# Patient Record
Sex: FEMALE | Race: WHITE | NOT HISPANIC OR LATINO
[De-identification: names, ages, dates, MRNs, and addresses within clinical notes are randomized per-mention and may not be internally consistent; named-entity substitution may affect disease eponyms.]

---

## 2022-05-09 PROBLEM — Z00.00 ENCOUNTER FOR PREVENTIVE HEALTH EXAMINATION: Status: ACTIVE | Noted: 2022-05-09

## 2022-05-10 ENCOUNTER — APPOINTMENT (OUTPATIENT)
Dept: ORTHOPEDIC SURGERY | Facility: CLINIC | Age: 57
End: 2022-05-10
Payer: COMMERCIAL

## 2022-05-10 VITALS — BODY MASS INDEX: 23.05 KG/M2 | HEIGHT: 64 IN | WEIGHT: 135 LBS

## 2022-05-10 DIAGNOSIS — Z78.9 OTHER SPECIFIED HEALTH STATUS: ICD-10-CM

## 2022-05-10 DIAGNOSIS — M24.011 LOOSE BODY IN RIGHT SHOULDER: ICD-10-CM

## 2022-05-10 PROCEDURE — 99214 OFFICE O/P EST MOD 30 MIN: CPT

## 2022-05-10 PROCEDURE — 73030 X-RAY EXAM OF SHOULDER: CPT | Mod: LT

## 2022-05-10 RX ORDER — PREDNISONE 20 MG/1
20 TABLET ORAL DAILY
Qty: 10 | Refills: 0 | Status: ACTIVE | COMMUNITY
Start: 2022-05-10 | End: 1900-01-01

## 2022-05-10 RX ORDER — ETANERCEPT 25 MG/.5ML
25 SOLUTION SUBCUTANEOUS
Refills: 0 | Status: ACTIVE | COMMUNITY

## 2022-05-10 RX ORDER — CELECOXIB 200 MG/1
200 CAPSULE ORAL
Refills: 0 | Status: ACTIVE | COMMUNITY

## 2022-05-10 NOTE — HISTORY OF PRESENT ILLNESS
[de-identified] : The patient has right shoulder pain s/p rotator cuff repair revision in 2017. She has pain at night for 6-7 months. pain is 8/10. Patient denies any recent PT or injections. Patient has a history of RA and is taking Enbrel. However, patient feels this is more mechanical pain. The patients pain is both posterior and anterior to the shoulder, worse with increased activity. The patient has tried Prednisone and Mobic in the past for other issues and responds well.\par For the left shoulder the patient was previously diagnosed with low grade partial thickness cuff tear. Prednisone and Mobic were helpful for this. Patient did not try PT. Her left shoulder pain has increased in the last 6-7 months, worse at night. The patient also reports decreased range of motion.\par

## 2022-05-10 NOTE — PHYSICAL EXAM
[Right] : right shoulder [Left] : left shoulder [4___] : internal rotation 4[unfilled]/5 [de-identified] : Constitutional: The general appearance of the patient is well developed, well nourished, no deformities and well groomed. Normal\par \par  Gait: Gait and function is as follows: normal gait.\par \par  Skin: Head and neck visualized skin is normal. Left upper extremity visualized skin is normal. Right upper extremity visualized skin is normal. \par \par  Cardiovascular: palpable radial pulse bilaterally.\par \par  Neurologic: The patient is oriented to time, place and person. Sensation to light touch intact in the bilateral upper extremities. Mood and Affect is normal.\par  [] : no tenderness at bicipital groove [de-identified] : -shine bilaterally. + speeds bilaterally.  [FreeTextEntry8] : supra TTP. AC joint TTP. [FreeTextEntry9] : at 70 ABD there is 30 IR and 80 ER [de-identified] : supra 4/5 [FreeTextEntry1] : right shoulder x-rays: RCR hardware intact. no high riding humeral head. no calcifications. loose body that appears bony. \par left shoulder x-rays: no high riding humeral head. no GHOA. small calcific tendonitis of the rotator cuff. type I acromion.  [TWNoteComboBox7] : active forward flexion 170 degrees [TWNoteComboBox4] : passive forward flexion 130 degrees [de-identified] : active abduction 100 degrees [TWNoteComboBox9] : passive abduction 70 degrees [TWNoteComboBox6] : internal rotation L1 [de-identified] : external rotation 70 degrees

## 2022-05-10 NOTE — DISCUSSION/SUMMARY
[de-identified] : The patient has a right shoulder possible loose body near the glenohumeral joint. She also has a left shoulder calcific tendonitis of the rotator cuff\par  \par For the right shoulder:\par The patient was prescribed a CT arthrogram of the right shoulder to evaluate for loose body\par If there is a loose body present, in the GH joint, the patient would need an arthroscopic removal of that loose body because of the locking complaints. \par \par For the left shoulder:\par The nature of calcific tendonitis was discussed with the patient in detail \par The patient was prescribed a 5 day course of prednisone \par The possibility of early adhesive capsulitis was discussed with the patient \par The patient will hold off on PT at this time until we evaluate the need for possible right shoulder Sx\par If the patient’s pain is not improved, we could consider a SA cortisone injection \par \par The patient will follow up in 1-2 weeks to review CT scan\par We could consider starting PT for the bilateral shoulders if loose body is ruled out for right shoulder \par \par \par I, Dr. Tray Aponte, attest that this documentation was recorded by the scribe, in my presence, and that it accurately reflects the service I personally performed, and the decisions made by me with my edits as appropriate.\par \par IKyle, acting as scribe, attest that this documentation has been prepared under the direction and in the presence of Provider Tray Aponte MD.\par

## 2022-05-10 NOTE — REASON FOR VISIT
[FreeTextEntry2] : The patient has left shoulder low grade partial thickness supraspinatus tear with subacromial impingement syndrome and low grade proximal biceps tear. The patient also presents today for right shoulder pain of 6-7 months duration.

## 2022-05-12 ENCOUNTER — RESULT REVIEW (OUTPATIENT)
Age: 57
End: 2022-05-12

## 2022-05-19 ENCOUNTER — APPOINTMENT (OUTPATIENT)
Dept: ORTHOPEDIC SURGERY | Facility: CLINIC | Age: 57
End: 2022-05-19
Payer: COMMERCIAL

## 2022-05-19 VITALS — HEIGHT: 64 IN | WEIGHT: 135 LBS | BODY MASS INDEX: 23.05 KG/M2

## 2022-05-19 PROCEDURE — 99214 OFFICE O/P EST MOD 30 MIN: CPT

## 2022-05-19 NOTE — HISTORY OF PRESENT ILLNESS
[de-identified] : The patient states that she still has right shoulder intermittent pain and locking. It is minimally improved with prednisone. she denies new trauma to the shoulder. she is here for the review of CT arthrogram. \par \par The patient states that her left shoulder pain has improved after prednisone, but still present.\par \par \par

## 2022-05-19 NOTE — PHYSICAL EXAM
[Right] : right shoulder [Left] : left shoulder [4___] : internal rotation 4[unfilled]/5 [de-identified] : Constitutional: The general appearance of the patient is well developed, well nourished, no deformities and well groomed. Normal\par \par  Gait: Gait and function is as follows: normal gait.\par \par  Skin: Head and neck visualized skin is normal. Left upper extremity visualized skin is normal. Right upper extremity visualized skin is normal. \par \par  Cardiovascular: palpable radial pulse bilaterally.\par \par  Neurologic: The patient is oriented to time, place and person. Sensation to light touch intact in the bilateral upper extremities. Mood and Affect is normal.\par  [] : no tenderness at bicipital groove [de-identified] : -rl, + speeds  [FreeTextEntry8] : supra TTP. AC joint TTP. [FreeTextEntry9] : at 70 ABD there is 30 IR and 80 ER [de-identified] : supra 4/5 [FreeTextEntry1] : 5/12/22 CT arthrogram right shoulder. moderate sized full thickness rotator cuff tear. ossification of inferior glenoid that appears to be extracapsular, supra intact. no ac joint arthritis. biceps tenodeses intact. subscap intact. no GHOA anterior and posterior labrum intact. grade 1 atrophy of supraspinatus muscle belly.\par \par  [TWNoteComboBox7] : active forward flexion 170 degrees [TWNoteComboBox4] : passive forward flexion 130 degrees [de-identified] : active abduction 100 degrees [TWNoteComboBox9] : passive abduction 70 degrees [TWNoteComboBox6] : internal rotation L1 [de-identified] : external rotation 70 degrees

## 2022-05-19 NOTE — REASON FOR VISIT
[FreeTextEntry2] : The patient has right shoulder possible loose body near the glenohumeral joint. The also has left shoulder calcific tendonitis of the rotator cuff.

## 2022-05-19 NOTE — DISCUSSION/SUMMARY
[de-identified] :  The patient has right shoulder recurrent moderate sized full thickness tear of supraspinatus tendon. Patient has left shoulder calcific tendonitis.\par \par \par \par For the right shoulder:\par Patient is unable to consider right shoulder surgery as this tike secondary to job restrictions.\par She is considering surgery in October if necessary\par Patient will proceed with HEP.\par Patient will follow up as needed.\par Once pain or function become intolerable we will give consideration to revision RCR with bio-inductive patch augmentation and application of PRP.\par \par \par For the left shoulder:\par The patient will continue with activity as tolerated.\par She was given a prescription for prednisone to use if there is increased pain in the shoulder.\par We could consider subacromial injections as needed.\par \par \par \par \par \par I, Dr. Tray Aponte, attest that this documentation was recorded by the scribe, in my presence, and that it accurately reflects the service I personally performed, and the decisions made by me with my edits as appropriate.\par \par I, Kyle Alan, acting as scribe, attest that this documentation has been prepared under the direction and in the presence of Provider Tray Aponte MD.\par \par

## 2022-06-02 ENCOUNTER — APPOINTMENT (OUTPATIENT)
Dept: ORTHOPEDIC SURGERY | Facility: CLINIC | Age: 57
End: 2022-06-02
Payer: COMMERCIAL

## 2022-06-02 VITALS — WEIGHT: 135 LBS | HEIGHT: 64 IN | BODY MASS INDEX: 23.05 KG/M2

## 2022-06-02 PROCEDURE — 99213 OFFICE O/P EST LOW 20 MIN: CPT | Mod: 25

## 2022-06-02 PROCEDURE — 20605 DRAIN/INJ JOINT/BURSA W/O US: CPT | Mod: 50

## 2022-06-02 RX ORDER — PREDNISONE 20 MG/1
20 TABLET ORAL DAILY
Qty: 10 | Refills: 0 | Status: ACTIVE | COMMUNITY
Start: 2022-06-02 | End: 1900-01-01

## 2022-06-02 NOTE — PROCEDURE
[Medium Joint Injection] : medium joint injection [Bilateral] : bilaterally of the [CMC Joint] : CMC joint [Pain] : pain [Inflammation] : inflammation [Alcohol] : alcohol [Ethyl Chloride sprayed topically] : ethyl chloride sprayed topically [Sterile technique used] : sterile technique used [___ cc    1%] : Lidocaine ~Vcc of 1%  [___ cc    10mg] : Triamcinolone (Kenalog) ~Vcc of 10 mg

## 2022-06-02 NOTE — HISTORY OF PRESENT ILLNESS
[7] : 7 [Full time] : Work status: full time [de-identified] : Ms. Kennedi Castillo, a 56-year-old female, presents today for b/l cmc arthritis. 6 months s/p 5th CSI with relief.\par Symptoms reoccurred. [] : no [FreeTextEntry1] : shanta hands  [de-identified] : Would like to receive injections today

## 2022-08-29 ENCOUNTER — APPOINTMENT (OUTPATIENT)
Dept: ORTHOPEDIC SURGERY | Facility: CLINIC | Age: 57
End: 2022-08-29

## 2022-08-29 PROCEDURE — 20611 DRAIN/INJ JOINT/BURSA W/US: CPT | Mod: 50

## 2022-08-29 PROCEDURE — J3490M: CUSTOM

## 2022-08-29 PROCEDURE — 99214 OFFICE O/P EST MOD 30 MIN: CPT | Mod: 25

## 2022-08-29 NOTE — HISTORY OF PRESENT ILLNESS
[de-identified] : 8/29/22: Cortisone in Jt helped a lot, pain returned recently.\par \par Prev doc:\par 11/29/21: Ms. Kennedi Castillo, a 56-year-old female, presents today for B/L knees. left knee worse then right. recently had Geniculate nerve ablation B/l knees with Dr. Frankenburger ablation nerve September 28, right knee/leg and Oct 5th left knee/leg. Complaint of locking in the left knee repeatedly. +nasids use. +nocturnal awakening. Tried CSI Injections and tried gel injections More relief from CSI injections.\par 1/31/22: Significant relief after last injection, wants to try again as pain returned recently.\par

## 2022-08-29 NOTE — ASSESSMENT
[FreeTextEntry1] : Previous doc:\par 11/29/21: Adv B/L Varus OA, with left knee worse then right knee. Tried nsaids, CSI injections, Gel injections, and genicular nerve ablation B/L knees. Seeing as she exhausted all forms of conservative treatment for OA she is a candidate for B/L TKA. Reports her  is having TKA in 1-2 wks, so she will see how he responds, Pt requested CSI injection today for B/l knees.\par 1/31/22: Excellent relief from inj last visit - it has only been 2 months since then but will repeat inj today and discussed risk of injections too frequently - she will try to hold off until June for her next inj. She understands that she needs TKA but her  is currently recovering and she is trying to delay to later in the year.\par \par 8/29/22: Repeat inj both knees tolerated well.  Will get auth for orthovisc both knees.  Trying to delay TKA.

## 2022-08-29 NOTE — PROCEDURE
[FreeTextEntry3] : Large joint injection was performed on the both knees. The indication for this procedure was pain, inflammation, and x-ray evidence of Osteoarthritis on this or prior visit. The site was prepped with betadine, ethyl chloride sprayed topically, and sterile technique used. An injection of Lidocaine 3cc of 1% , Bupivacaine (Marcaine) 5cc of 0.25% , Methylprednisolone (Depomedrol) cc of 80 mg was used. Patient was advised to call if redness, pain, or fever occur, apply ice for 15 minutes out of every hour for the next 12-24 hours as tolerated and patient was advised to rest the joint(s) for days.\par Patient has tried OTC's including aspirin, Ibuprofen, Aleve, etc or prescription NSAIDS, and/or exercises at home and/or physical therapy without satisfactory response and patient had decreased mobility in the joint. Ultrasound guidance was indicated for this patient due to better visualize joint space. All ultrasound images have been permanently captured and stored accordingly in our picture.\par

## 2022-08-29 NOTE — IMAGING
[de-identified] : left knee: \par pain med and lateral joint line \par Crepitus\par NIVI\par Strenth 5/5\par Pulses +2 DP/PT\par Decreased ROM -\par \par right knee: \par pain med and lateral joint line \par Crepitus\par NIVI\par Strenth 5/5\par Pulses +2 DP/PT\par Decreased ROM -\par

## 2022-08-29 NOTE — DISCUSSION/SUMMARY
[de-identified] : The natural progression of Osteoarthritis was explained to the patient.  We discussed the possible treatment options from conservative to operative.  These included NSAIDS, Glucosamine and Chondrotin sulfate, Physical Therapy and injections.  We also discussed that at some point they may progress to needing a TKA.\par

## 2022-09-15 ENCOUNTER — APPOINTMENT (OUTPATIENT)
Dept: ORTHOPEDIC SURGERY | Facility: CLINIC | Age: 57
End: 2022-09-15
Payer: COMMERCIAL

## 2022-09-15 PROCEDURE — 20610 DRAIN/INJ JOINT/BURSA W/O US: CPT | Mod: LT

## 2022-09-15 PROCEDURE — 99214 OFFICE O/P EST MOD 30 MIN: CPT | Mod: 25

## 2022-09-15 NOTE — PROCEDURE
[Large Joint Injection] : Large joint injection [Left] : of the left [Subacromial Space] : subacromial space [Pain] : pain [Inflammation] : inflammation [Betadine] : betadine [Ethyl Chloride sprayed topically] : ethyl chloride sprayed topically [Sterile technique used] : sterile technique used [___ cc    1%] : Lidocaine ~Vcc of 1%  [___ cc    40mg] : Methylprednisolone (Depomedrol) ~Vcc of 40 mg  [Call if redness, pain or fever occur] : call if redness, pain or fever occur [Apply ice for 15min out of every hour for the next 12-24 hours as tolerated] : apply ice for 15 minutes out of every hour for the next 12-24 hours as tolerated [Patient was advised to rest the joint(s) for ____ days] : patient was advised to rest the joint(s) for [unfilled] days [Risks, benefits, alternatives discussed / Verbal consent obtained] : the risks benefits, and alternatives have been discussed, and verbal consent was obtained

## 2022-09-15 NOTE — PHYSICAL EXAM
[Right] : right shoulder [5___] : internal rotation 5[unfilled]/5 [Normal Coordination] : normal coordination [Normal Sensation] : normal sensation [Normal Mood and Affect] : normal mood and affect [Orientated] : orientated [Able to Communicate] : able to communicate [Normal Skin] : normal skin [No obvious lymphadenopathy in areas examined] : no obvious lymphadenopathy in areas examined [Well Developed] : well developed [Well Nourished] : well nourished [Peripheral vascular exam is grossly normal] : peripheral vascular exam is grossly normal [No Respiratory Distress] : no respiratory distress [] : no sero-sanguinous drainage [FreeTextEntry8] : + biceps tenderness [FreeTextEntry9] : Internal rotation to T8 [de-identified] : 5 out of 5 supraspinatus. [TWNoteComboBox7] : active forward flexion 180 degrees [de-identified] : active abduction 90 degrees [de-identified] : external rotation 60 degrees

## 2022-09-15 NOTE — HISTORY OF PRESENT ILLNESS
[de-identified] : The patient is a 57 year old male here for re-evaluation of both shoulders. She continues to have bilateral shoulder pain and would like to go forward with rotator cuff surgery on the right. The patient's pain is posterior to anterior. She takes Celebrex and is on Embril for rheumatoid arthritis. The Embril is a weekly medication. The pain is worse with sleeping. She has no complaints of radiating pain. The patient has had 2 prior rotator cuff surgeries on the right shoulder. \par \par For her left shoulder:\par Her left shoulder pain has increased and is worse at night. The patient also reports decreased range of motion. She is wondering if she can have a cortisone injection in the shoulder today.\par

## 2022-09-15 NOTE — REASON FOR VISIT
[Spouse] : spouse [FreeTextEntry2] : The patient has right shoulder recurrent moderate sized full thickness tear of supraspinatus tendon. Patient has left shoulder calcific tendonitis.

## 2022-09-15 NOTE — DISCUSSION/SUMMARY
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 .The patient also has left shoulder calcific tendonitis of the rotator cuff. \par \par For the left shoulder:\par The patient is still having persistent pain.\par The patient tried prednisone without success.\par The patient will try a subacromial injection today.\par \par For the right shoulder:\par The patient has failed multiple non operative treatment options, including activity modification, PT and medications.  \par She is till having persistent pain.\par All risks, benefits and alternatives to surgery were discussed in detail with the patient. \par The patient chooses to proceed with surgical intervention at this time. \par \par  \par The indication is as follows: \par \par The patient is indicated for right shoulder arthroscopy with revision RCR, application of bio inductive implant, regenten patch, and application of PRP.\par \par \par The procedure is scheduled for 11/21/2022. \par \par The procedure will take 1 hours. \par \par The doctor will require 1 assistants. \par \par The patient will require medical and rheumatology clearance. \par \par The patient will require cryotherapy. \par \par The patient will require a Mower Arc 2.0 brace. \par \par We will need the [employer], and [employer] representative.\par \par We need rheumatology clearance to determine when to stop Embril.\par \par \par \par I, Dr. Tray Aponte, attest that this documentation was recorded by the scribe, in my presence, and that it accurately reflects the service I personally performed, and the decisions made by me with my edits as appropriate.\par  \par \par I, Carly Arellano, acting as scribe, attest that this documentation has been prepared under the direction and in the presence of Provider Tray Aponte MD.\par

## 2022-09-19 ENCOUNTER — APPOINTMENT (OUTPATIENT)
Dept: ORTHOPEDIC SURGERY | Facility: CLINIC | Age: 57
End: 2022-09-19

## 2022-09-19 VITALS — HEIGHT: 64 IN | WEIGHT: 135 LBS | BODY MASS INDEX: 23.05 KG/M2

## 2022-09-19 PROCEDURE — 99213 OFFICE O/P EST LOW 20 MIN: CPT | Mod: 25

## 2022-09-19 PROCEDURE — 20605 DRAIN/INJ JOINT/BURSA W/O US: CPT | Mod: 50

## 2022-09-19 NOTE — HISTORY OF PRESENT ILLNESS
[8] : 8 [7] : 7 [de-identified] : 57 year old female followed for b/l first CMC arthritis. 3 months s/p b/l CMC CSI with good relief. Recent reoccurrence.  [FreeTextEntry1] : Bilateral wrists

## 2022-10-17 ENCOUNTER — APPOINTMENT (OUTPATIENT)
Dept: ORTHOPEDIC SURGERY | Facility: CLINIC | Age: 57
End: 2022-10-17

## 2022-10-17 VITALS — WEIGHT: 135 LBS | HEIGHT: 64 IN | BODY MASS INDEX: 23.05 KG/M2

## 2022-10-17 PROCEDURE — 20611 DRAIN/INJ JOINT/BURSA W/US: CPT | Mod: 50

## 2022-10-17 PROCEDURE — 99212 OFFICE O/P EST SF 10 MIN: CPT | Mod: 25

## 2022-10-17 NOTE — IMAGING
[de-identified] : left knee: \par pain med and lateral joint line \par Crepitus\par NIVI\par Strenth 5/5\par Pulses +2 DP/PT\par Decreased ROM -\par \par right knee: \par pain med and lateral joint line \par Crepitus\par NIVI\par Strenth 5/5\par Pulses +2 DP/PT\par Decreased ROM -\par

## 2022-10-17 NOTE — ASSESSMENT
[FreeTextEntry1] : Previous doc:\par 11/29/21: Adv B/L Varus OA, with left knee worse then right knee. Tried nsaids, CSI injections, Gel injections, and genicular nerve ablation B/L knees. Seeing as she exhausted all forms of conservative treatment for OA she is a candidate for B/L TKA. Reports her  is having TKA in 1-2 wks, so she will see how he responds, Pt requested CSI injection today for B/l knees.\par 1/31/22: Excellent relief from inj last visit - it has only been 2 months since then but will repeat inj today and discussed risk of injections too frequently - she will try to hold off until June for her next inj. She understands that she needs TKA but her  is currently recovering and she is trying to delay to later in the year.\par 8/29/22: Repeat inj both knees tolerated well.  Will get auth for orthovisc both knees.  Trying to delay TKA.\par \par 10/17/22: Inj tolerated well.

## 2022-10-17 NOTE — PROCEDURE
[Large Joint Injection] : Large joint injection [Bilateral] : bilaterally of the [Knee] : knee [Pain] : pain [X-ray evidence of Osteoarthritis on this or prior visit] : x-ray evidence of Osteoarthritis on this or prior visit [Betadine] : betadine [Ethyl Chloride sprayed topically] : ethyl chloride sprayed topically [Sterile technique used] : sterile technique used [Orthovisc] : Orthovisc [#1] : series #1 [] : Patient tolerated procedure well [Call if redness, pain or fever occur] : call if redness, pain or fever occur [Apply ice for 15min out of every hour for the next 12-24 hours as tolerated] : apply ice for 15 minutes out of every hour for the next 12-24 hours as tolerated [Previous OTC use and PT nontherapeutic] : patient has tried OTC's including aspirin, Ibuprofen, Aleve, etc or prescription NSAIDS, and/or exercises at home and/or physical therapy without satisfactory response [Patient had decreased mobility in the joint] : patient had decreased mobility in the joint [Risks, benefits, alternatives discussed / Verbal consent obtained] : the risks benefits, and alternatives have been discussed, and verbal consent was obtained [FreeTextEntry3] : Large joint injection was performed on the both knees. The indication for this procedure was pain, inflammation, and x-ray evidence of Osteoarthritis on this or prior visit. The site was prepped with betadine, ethyl chloride sprayed topically, and sterile technique used. An injection of Lidocaine 3cc of 1% , Bupivacaine (Marcaine) 5cc of 0.25% , Methylprednisolone (Depomedrol) cc of 80 mg was used. Patient was advised to call if redness, pain, or fever occur, apply ice for 15 minutes out of every hour for the next 12-24 hours as tolerated and patient was advised to rest the joint(s) for days.\par Patient has tried OTC's including aspirin, Ibuprofen, Aleve, etc or prescription NSAIDS, and/or exercises at home and/or physical therapy without satisfactory response and patient had decreased mobility in the joint. Ultrasound guidance was indicated for this patient due to better visualize joint space. All ultrasound images have been permanently captured and stored accordingly in our picture.\par

## 2022-10-17 NOTE — HISTORY OF PRESENT ILLNESS
[de-identified] : 10/17/22: Orthovisc #1 b/l knees.\par \par Prev doc:\par 11/29/21: Ms. Kennedi Castillo, a 56-year-old female, presents today for B/L knees. left knee worse then right. recently had Geniculate nerve ablation B/l knees with Dr. Frankenburger ablation nerve September 28, right knee/leg and Oct 5th left knee/leg. Complaint of locking in the left knee repeatedly. +nasids use. +nocturnal awakening. Tried CSI Injections and tried gel injections More relief from CSI injections.\par 1/31/22: Significant relief after last injection, wants to try again as pain returned recently.\par 8/29/22: Cortisone in Jt helped a lot, pain returned recently.

## 2022-10-24 ENCOUNTER — APPOINTMENT (OUTPATIENT)
Dept: ORTHOPEDIC SURGERY | Facility: CLINIC | Age: 57
End: 2022-10-24

## 2022-10-24 VITALS — WEIGHT: 135 LBS | HEIGHT: 64 IN | BODY MASS INDEX: 23.05 KG/M2

## 2022-10-24 PROCEDURE — 99213 OFFICE O/P EST LOW 20 MIN: CPT | Mod: 25

## 2022-10-24 PROCEDURE — 20611 DRAIN/INJ JOINT/BURSA W/US: CPT | Mod: 50

## 2022-10-31 ENCOUNTER — APPOINTMENT (OUTPATIENT)
Dept: ORTHOPEDIC SURGERY | Facility: CLINIC | Age: 57
End: 2022-10-31

## 2022-10-31 PROCEDURE — 20611 DRAIN/INJ JOINT/BURSA W/US: CPT | Mod: 50

## 2022-10-31 PROCEDURE — 99212 OFFICE O/P EST SF 10 MIN: CPT | Mod: 25

## 2022-10-31 NOTE — HISTORY OF PRESENT ILLNESS
[] : This patient has had an injection before: yes [3] : 3 [Orthovisc] : Orthovisc [de-identified] : 10/31/22: Orthovisc #3 b/l knees, improving.\par \par Prev doc:\par 11/29/21: Ms. Kennedi Castillo, a 56-year-old female, presents today for B/L knees. left knee worse then right. recently had Geniculate nerve ablation B/l knees with Dr. Frankenburger ablation nerve September 28, right knee/leg and Oct 5th left knee/leg. Complaint of locking in the left knee repeatedly. +nasids use. +nocturnal awakening. Tried CSI Injections and tried gel injections More relief from CSI injections.\par 1/31/22: Significant relief after last injection, wants to try again as pain returned recently.\par 8/29/22: Cortisone in Jan helped a lot, pain returned recently. \par 10/17/22: Orthovisc #1 b/l knees.

## 2022-10-31 NOTE — IMAGING
[de-identified] : left knee: \par pain med and lateral joint line \par Crepitus\par NIVI\par Strenth 5/5\par Pulses +2 DP/PT\par Decreased ROM -\par \par right knee: \par pain med and lateral joint line \par Crepitus\par NIVI\par Strenth 5/5\par Pulses +2 DP/PT\par Decreased ROM -\par

## 2022-10-31 NOTE — PROCEDURE
[Large Joint Injection] : Large joint injection [Bilateral] : bilaterally of the [Knee] : knee [Pain] : pain [X-ray evidence of Osteoarthritis on this or prior visit] : x-ray evidence of Osteoarthritis on this or prior visit [Betadine] : betadine [Ethyl Chloride sprayed topically] : ethyl chloride sprayed topically [Sterile technique used] : sterile technique used [Orthovisc] : Orthovisc [#3] : series #3 [] : Patient tolerated procedure well [Call if redness, pain or fever occur] : call if redness, pain or fever occur [Apply ice for 15min out of every hour for the next 12-24 hours as tolerated] : apply ice for 15 minutes out of every hour for the next 12-24 hours as tolerated [Previous OTC use and PT nontherapeutic] : patient has tried OTC's including aspirin, Ibuprofen, Aleve, etc or prescription NSAIDS, and/or exercises at home and/or physical therapy without satisfactory response [Patient had decreased mobility in the joint] : patient had decreased mobility in the joint [Risks, benefits, alternatives discussed / Verbal consent obtained] : the risks benefits, and alternatives have been discussed, and verbal consent was obtained [Prior failure or difficult injection] : prior failure or difficult injection [All ultrasound images have been permanently captured and stored accordingly in our picture archiving and communication system] : All ultrasound images have been permanently captured and stored accordingly in our picture archiving and communication system [Visualization of the needle and placement of injection was performed without complication] : visualization of the needle and placement of injection was performed without complication

## 2022-11-06 NOTE — IMAGING
[de-identified] : left knee: \par pain med and lateral joint line \par Crepitus\par NIVI\par Strenth 5/5\par Pulses +2 DP/PT\par Decreased ROM -\par \par right knee: \par pain med and lateral joint line \par Crepitus\par NIVI\par Strenth 5/5\par Pulses +2 DP/PT\par Decreased ROM -\par

## 2022-11-06 NOTE — ASSESSMENT
[FreeTextEntry1] : Previous doc:\par 11/29/21: Adv B/L Varus OA, with left knee worse then right knee. Tried nsaids, CSI injections, Gel injections, and genicular nerve ablation B/L knees. Seeing as she exhausted all forms of conservative treatment for OA she is a candidate for B/L TKA. Reports her  is having TKA in 1-2 wks, so she will see how he responds, Pt requested CSI injection today for B/l knees.\par 1/31/22: Excellent relief from inj last visit - it has only been 2 months since then but will repeat inj today and discussed risk of injections too frequently - she will try to hold off until June for her next inj. She understands that she needs TKA but her  is currently recovering and she is trying to delay to later in the year.\par 8/29/22: Repeat inj both knees tolerated well.  Will get auth for orthovisc both knees.  Trying to delay TKA.\par \par 10/17/22: Inj tolerated well.\par \par 10/24/2022: injection tolerated well

## 2022-11-06 NOTE — PROCEDURE
[Large Joint Injection] : Large joint injection [Bilateral] : bilaterally of the [Knee] : knee [Pain] : pain [X-ray evidence of Osteoarthritis on this or prior visit] : x-ray evidence of Osteoarthritis on this or prior visit [Betadine] : betadine [Ethyl Chloride sprayed topically] : ethyl chloride sprayed topically [Sterile technique used] : sterile technique used [Orthovisc] : Orthovisc [#2] : series #2 [] : Patient tolerated procedure well [Call if redness, pain or fever occur] : call if redness, pain or fever occur [Apply ice for 15min out of every hour for the next 12-24 hours as tolerated] : apply ice for 15 minutes out of every hour for the next 12-24 hours as tolerated [Previous OTC use and PT nontherapeutic] : patient has tried OTC's including aspirin, Ibuprofen, Aleve, etc or prescription NSAIDS, and/or exercises at home and/or physical therapy without satisfactory response [Patient had decreased mobility in the joint] : patient had decreased mobility in the joint [Risks, benefits, alternatives discussed / Verbal consent obtained] : the risks benefits, and alternatives have been discussed, and verbal consent was obtained [Prior failure or difficult injection] : prior failure or difficult injection [All ultrasound images have been permanently captured and stored accordingly in our picture archiving and communication system] : All ultrasound images have been permanently captured and stored accordingly in our picture archiving and communication system [Visualization of the needle and placement of injection was performed without complication] : visualization of the needle and placement of injection was performed without complication

## 2022-11-06 NOTE — HISTORY OF PRESENT ILLNESS
[de-identified] : Prev doc:\par 11/29/21: Ms. Kennedi Castillo, a 56-year-old female, presents today for B/L knees. left knee worse then right. recently had Geniculate nerve ablation B/l knees with Dr. Frankenburger ablation nerve September 28, right knee/leg and Oct 5th left knee/leg. Complaint of locking in the left knee repeatedly. +nasids use. +nocturnal awakening. Tried CSI Injections and tried gel injections More relief from CSI injections.\par 1/31/22: Significant relief after last injection, wants to try again as pain returned recently.\par 8/29/22: Cortisone in Jan helped a lot, pain returned recently. \par 10/17/22: Orthovisc #1 b/l knees.\par 10/24/2022: Orthovisc #2 bilateral knees [] : This patient has had an injection before: yes [3] : 3 [Orthovisc] : Orthovisc

## 2022-11-06 NOTE — DISCUSSION/SUMMARY
[de-identified] : The natural progression of osteoarthritis was explained to the patient.  We discussed the possible treatment options from conservative to operative.  These included NSAIDS, Glucosamine and Chondrotin sulfate, and Physical Therapy as well different types of injections.  We also discussed that at some point they may progress to needed a TKA.  Information and pamphlets were given.\par \par We discussed their diagnosis and treatment options at length including surgical and non-surgical options. We will first attempt conservative treatment with activity modification, PT, icing, weight loss, and anti-inflammatory medications. We discussed the possible of injections (steroid and viscosupplementation) in the future. The patient was provided with a PT prescription to work on ROM, hip ER/abductors strengthening, quad/hamstring stretches and strengthening, and other exercises on the Knee Arthritis Protocol.\par \par Dx / Natural History:\par The patient was advised of the diagnosis.  The natural history of the pathology was explained in full to the patient in layman's terms.  Several different treatment options were discussed and explained in full to the patient including the risks and benefits of both surgical and non-surgical treatments.  All questions and concerns were answered. \par \par Pain Guide Activities:\par The patient was advised to let pain guide the gradual advancement of activities.\par \par Physical Therapy:\par The patient was provided with a prescription for Physical Therapy.\par \par Icing:\par The patient was advised to apply ice (wrapped in a towel or protective covering) to the area daily (20 minutes at a time, 2-4X/day).\par \par Follow up in 4-6 weeks to re-evaluate.

## 2022-11-07 ENCOUNTER — APPOINTMENT (OUTPATIENT)
Dept: ORTHOPEDIC SURGERY | Facility: CLINIC | Age: 57
End: 2022-11-07
Payer: COMMERCIAL

## 2022-11-07 VITALS — HEIGHT: 64 IN | WEIGHT: 135 LBS | BODY MASS INDEX: 23.05 KG/M2

## 2022-11-07 PROCEDURE — 99212 OFFICE O/P EST SF 10 MIN: CPT | Mod: 25

## 2022-11-07 PROCEDURE — 20611 DRAIN/INJ JOINT/BURSA W/US: CPT | Mod: 50

## 2022-11-07 NOTE — PROCEDURE
[Large Joint Injection] : Large joint injection [Bilateral] : bilaterally of the [Knee] : knee [Pain] : pain [X-ray evidence of Osteoarthritis on this or prior visit] : x-ray evidence of Osteoarthritis on this or prior visit [Betadine] : betadine [Ethyl Chloride sprayed topically] : ethyl chloride sprayed topically [Sterile technique used] : sterile technique used [Orthovisc] : Orthovisc [] : Patient tolerated procedure well [Call if redness, pain or fever occur] : call if redness, pain or fever occur [Apply ice for 15min out of every hour for the next 12-24 hours as tolerated] : apply ice for 15 minutes out of every hour for the next 12-24 hours as tolerated [Previous OTC use and PT nontherapeutic] : patient has tried OTC's including aspirin, Ibuprofen, Aleve, etc or prescription NSAIDS, and/or exercises at home and/or physical therapy without satisfactory response [Patient had decreased mobility in the joint] : patient had decreased mobility in the joint [Risks, benefits, alternatives discussed / Verbal consent obtained] : the risks benefits, and alternatives have been discussed, and verbal consent was obtained [Prior failure or difficult injection] : prior failure or difficult injection [All ultrasound images have been permanently captured and stored accordingly in our picture archiving and communication system] : All ultrasound images have been permanently captured and stored accordingly in our picture archiving and communication system [Visualization of the needle and placement of injection was performed without complication] : visualization of the needle and placement of injection was performed without complication [#4] : series #4

## 2022-11-07 NOTE — ASSESSMENT
[FreeTextEntry1] : Previous doc:\par 11/29/21: Adv B/L Varus OA, with left knee worse then right knee. Tried nsaids, CSI injections, Gel injections, and genicular nerve ablation B/L knees. Seeing as she exhausted all forms of conservative treatment for OA she is a candidate for B/L TKA. Reports her  is having TKA in 1-2 wks, so she will see how he responds, Pt requested CSI injection today for B/l knees.\par 1/31/22: Excellent relief from inj last visit - it has only been 2 months since then but will repeat inj today and discussed risk of injections too frequently - she will try to hold off until June for her next inj. She understands that she needs TKA but her  is currently recovering and she is trying to delay to later in the year.\par 8/29/22: Repeat inj both knees tolerated well.  Will get auth for orthovisc both knees.  Trying to delay TKA.\par 10/17/22: Inj tolerated well.\par \par 11/7/22: Inj tolerated well.

## 2022-11-07 NOTE — HISTORY OF PRESENT ILLNESS
[de-identified] : 11/7/22: Orthovisc #4 b/l knees.\par \par Prev doc:\par 11/29/21: Ms. Kennedi Castillo, a 56-year-old female, presents today for B/L knees. left knee worse then right. recently had Geniculate nerve ablation B/l knees with Dr. Frankenburger ablation nerve September 28, right knee/leg and Oct 5th left knee/leg. Complaint of locking in the left knee repeatedly. +nasids use. +nocturnal awakening. Tried CSI Injections and tried gel injections More relief from CSI injections.\par 1/31/22: Significant relief after last injection, wants to try again as pain returned recently.\par 8/29/22: Cortisone in Jt helped a lot, pain returned recently. \par 10/17/22: Orthovisc #1 b/l knees.\par 10/31/22: Orthovisc #3 b/l knees, improving.

## 2022-11-07 NOTE — IMAGING
[de-identified] : left knee: \par pain med and lateral joint line \par Crepitus\par NIVI\par Strenth 5/5\par Pulses +2 DP/PT\par Decreased ROM -\par \par right knee: \par pain med and lateral joint line \par Crepitus\par NIVI\par Strenth 5/5\par Pulses +2 DP/PT\par Decreased ROM -\par

## 2022-11-17 ENCOUNTER — APPOINTMENT (OUTPATIENT)
Dept: ORTHOPEDIC SURGERY | Facility: CLINIC | Age: 57
End: 2022-11-17

## 2022-11-17 DIAGNOSIS — M75.121 COMPLETE ROTATOR CUFF TEAR OR RUPTURE OF RIGHT SHOULDER, NOT SPECIFIED AS TRAUMATIC: ICD-10-CM

## 2022-11-17 PROCEDURE — 99214 OFFICE O/P EST MOD 30 MIN: CPT

## 2022-11-17 RX ORDER — OXYCODONE AND ACETAMINOPHEN 10; 325 MG/1; MG/1
10-325 TABLET ORAL
Qty: 21 | Refills: 0 | Status: ACTIVE | COMMUNITY
Start: 2022-11-17 | End: 1900-01-01

## 2022-11-17 NOTE — DISCUSSION/SUMMARY
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 .The patient also has left shoulder calcific tendonitis of the rotator cuff. \par \par The patient has an acute and complicated. \par She has a moderate risk of morbidity secondary to pending surgery and narcotic rx.  \par \par The preoperative, intraoperative, and postoperative courses were discussed. \par Patient will need postoperative icing, bracing, and PT. \par She was prescribed Percocet for postop pain. \par Home exercises were discussed.\par The patient was given and fitted for the brace today.\par The patient will follow up 7-10 days from surgery. \par \par \par For the left shoulder:\par \par The patient wishes to consider an SA Injection following surgery.

## 2022-11-17 NOTE — HISTORY OF PRESENT ILLNESS
[de-identified] : The patient is here for preoperative visit. She is relatively unchanged. She continues to have bilateral shoulder pain and would like to go forward with rotator cuff surgery on the right. The patient's pain is posterior to anterior. She takes Celebrex and is on Embril for rheumatoid arthritis. The Embril is a weekly medication. The pain is worse with sleeping. She has no complaints of radiating pain. The patient has had 2 prior rotator cuff surgeries on the right shoulder. \par \par For her left shoulder:\par Her left shoulder pain has increased and is worse at night. The patient also reports decreased range of motion. She is wondering if she can have a cortisone injection in the shoulder today.\par

## 2022-11-17 NOTE — PHYSICAL EXAM
[Normal Coordination] : normal coordination [Normal Sensation] : normal sensation [Normal Mood and Affect] : normal mood and affect [Orientated] : orientated [Able to Communicate] : able to communicate [Normal Skin] : normal skin [No obvious lymphadenopathy in areas examined] : no obvious lymphadenopathy in areas examined [Well Developed] : well developed [Well Nourished] : well nourished [Peripheral vascular exam is grossly normal] : peripheral vascular exam is grossly normal [No Respiratory Distress] : no respiratory distress [Right] : right shoulder [5___] : internal rotation 5[unfilled]/5 [] : no sero-sanguinous drainage [FreeTextEntry9] : Internal rotation to T8 [FreeTextEntry8] : + biceps tenderness [de-identified] : 5 out of 5 supraspinatus. [TWNoteComboBox7] : active forward flexion 180 degrees [de-identified] : active abduction 90 degrees [de-identified] : external rotation 60 degrees

## 2022-11-21 ENCOUNTER — APPOINTMENT (OUTPATIENT)
Dept: ORTHOPEDIC SURGERY | Facility: HOSPITAL | Age: 57
End: 2022-11-21

## 2022-11-21 ENCOUNTER — RESULT REVIEW (OUTPATIENT)
Age: 57
End: 2022-11-21

## 2022-11-21 PROCEDURE — 29876 ARTHRS KNEE SURG SYNVCT MAJ: CPT | Mod: RT

## 2022-11-21 PROCEDURE — 29827 SHO ARTHRS SRG RT8TR CUF RPR: CPT | Mod: AS,RT

## 2022-11-21 PROCEDURE — 29826 SHO ARTHRS SRG DECOMPRESSION: CPT | Mod: AS,RT

## 2022-11-21 PROCEDURE — 29827 SHO ARTHRS SRG RT8TR CUF RPR: CPT | Mod: RT

## 2022-11-29 ENCOUNTER — APPOINTMENT (OUTPATIENT)
Dept: ORTHOPEDIC SURGERY | Facility: CLINIC | Age: 57
End: 2022-11-29

## 2022-12-01 ENCOUNTER — APPOINTMENT (OUTPATIENT)
Dept: ORTHOPEDIC SURGERY | Facility: CLINIC | Age: 57
End: 2022-12-01
Payer: COMMERCIAL

## 2022-12-01 DIAGNOSIS — M25.512 PAIN IN LEFT SHOULDER: ICD-10-CM

## 2022-12-01 PROCEDURE — J3490M: CUSTOM

## 2022-12-01 PROCEDURE — 20610 DRAIN/INJ JOINT/BURSA W/O US: CPT | Mod: 79,LT

## 2022-12-01 PROCEDURE — 99214 OFFICE O/P EST MOD 30 MIN: CPT | Mod: 25

## 2022-12-01 RX ORDER — OXYCODONE AND ACETAMINOPHEN 10; 325 MG/1; MG/1
10-325 TABLET ORAL
Qty: 21 | Refills: 0 | Status: ACTIVE | COMMUNITY
Start: 2022-12-01 | End: 1900-01-01

## 2022-12-01 NOTE — HISTORY OF PRESENT ILLNESS
[de-identified] : The patient is s/p one week from surgery. She presents in brace and has been compliant. She is doing well overall. She is completing home exercises. The patient denies CP, SOB, fever, chills. She denies discharge or drainage from her incision. The patient has used narcotic rx at night and after longer work days. She denies new trauma. She denies residual numbness. \par \par As for the left shoulder and previously discussed the patient is here for SA injection. She reports continued pain with lifting activity. She has pain with overuse and repetitive use. She has anterior and lateral shoulder pain. She has pain with previous RC testing. She is amenable to an SA Injection today.

## 2022-12-01 NOTE — DISCUSSION/SUMMARY
[de-identified] : The patient is s/p a right shoulder revision arthroscopic rotator cuff repair using two Helix TI anchors, subacromial decompression, application of bioinductive patch, and application of PRP injection on 11/21/22. The patient has a left shoulder rotator cuff tendinitis with resolving calcific tendinitis. \par \par The patient is doing well postoperatively. \par The patient will wear brace for 5 more weeks.\par She will continue with HEP. \par She will use oral AIs as needed.\par She was sent Percocet for nighttime pain. \par The patient will follow up in 3 weeks to start conservative PT.\par She will be an XOA.\par \par The patient received a left shoulder SA injection. \par She tolerated it well.\par She will follow up as needed.\par \par

## 2022-12-01 NOTE — PHYSICAL EXAM
[Normal Coordination] : normal coordination [Normal Sensation] : normal sensation [Normal Mood and Affect] : normal mood and affect [Orientated] : orientated [Able to Communicate] : able to communicate [Normal Skin] : normal skin [Well Developed] : well developed [Well Nourished] : well nourished [Peripheral vascular exam is grossly normal] : peripheral vascular exam is grossly normal [Right] : right shoulder [Left] : left shoulder [5 ___] : forward flexion 5[unfilled]/5 [5___] : internal rotation 5[unfilled]/5 [] : no AC joint tenderness [TWNoteComboBox7] : active forward flexion 165 degrees [de-identified] : active abduction 90 degrees [TWNoteComboBox6] : internal rotation L4 [de-identified] : external rotation 70 degrees

## 2022-12-01 NOTE — PROCEDURE
[Large Joint Injection] : Large joint injection [Left] : of the left [Subacromial Space] : subacromial space [Pain] : pain [Inflammation] : inflammation [X-ray evidence of Osteoarthritis on this or prior visit] : x-ray evidence of Osteoarthritis on this or prior visit [Betadine] : betadine [Sterile technique used] : sterile technique used [___ cc    0.25%] : Bupivacaine (Marcaine) ~Vcc of 0.25%  [___ cc    80mg] : Methylprednisolone (Depomedrol) ~Vcc of 80 mg  [] : Patient tolerated procedure well [Call if redness, pain or fever occur] : call if redness, pain or fever occur [Apply ice for 15min out of every hour for the next 12-24 hours as tolerated] : apply ice for 15 minutes out of every hour for the next 12-24 hours as tolerated [Previous OTC use and PT nontherapeutic] : patient has tried OTC's including aspirin, Ibuprofen, Aleve, etc or prescription NSAIDS, and/or exercises at home and/or physical therapy without satisfactory response [Patient had decreased mobility in the joint] : patient had decreased mobility in the joint [Risks, benefits, alternatives discussed / Verbal consent obtained] : the risks benefits, and alternatives have been discussed, and verbal consent was obtained

## 2022-12-15 ENCOUNTER — APPOINTMENT (OUTPATIENT)
Dept: ORTHOPEDIC SURGERY | Facility: CLINIC | Age: 57
End: 2022-12-15

## 2022-12-15 PROCEDURE — 73030 X-RAY EXAM OF SHOULDER: CPT | Mod: RT

## 2022-12-15 PROCEDURE — 99024 POSTOP FOLLOW-UP VISIT: CPT

## 2022-12-15 NOTE — REASON FOR VISIT
[FreeTextEntry2] : The patient is s/p a right shoulder revision arthroscopic rotator cuff repair using two Helix TI anchors, subacromial decompression, application of bioinductive patch, and application of PRP injection on 11/21/22.

## 2022-12-15 NOTE — DISCUSSION/SUMMARY
[de-identified] : The patient is s/p a right shoulder revision arthroscopic rotator cuff repair using two Helix TI anchors, subacromial decompression, application of bioinductive patch, and application of PRP injection on 11/21/22.\par \par The patient is progressing well.\par The patient will continue use of the brace for 2 additional weeks.\par The patient will begin supervised PT.\par The patient will follow up in 4 weeks.\par \par I, Dr. Tray Aponte, attest that this documentation was recorded by the scribe, in my presence, and that it accurately reflects the service I personally performed, and the decisions made by me with my edits as appropriate.  \par \par I, Carly Arellano, acting as scribe, attest that this documentation has been prepared under the direction and in the presence of Provider Tray Aponte MD.\par

## 2022-12-15 NOTE — HISTORY OF PRESENT ILLNESS
[de-identified] : The patient is here for the right shoulder. The patient reports some discomfort but otherwise doing well. She has been wearing the brace. She is not taking Al's at this time. She denies fevers,chills, or drainage. She denies new injuries or traumas. She denies paraesthesias.

## 2023-01-17 ENCOUNTER — APPOINTMENT (OUTPATIENT)
Dept: ORTHOPEDIC SURGERY | Facility: CLINIC | Age: 58
End: 2023-01-17
Payer: COMMERCIAL

## 2023-01-17 PROCEDURE — 99024 POSTOP FOLLOW-UP VISIT: CPT

## 2023-01-17 NOTE — HISTORY OF PRESENT ILLNESS
[de-identified] : The patient is s/p two months postoperative from surgery. She is doing well and has discharged her brace full time. The patient has excellent ROM and strengthening is going well. The patient wishes to continue PT and was given an rx. She does not require pain medications and does not have constant pain. She is able to complete simple ADLs and is doing well overall.

## 2023-01-17 NOTE — PHYSICAL EXAM
[Normal Coordination] : normal coordination [Normal Sensation] : normal sensation [Normal Mood and Affect] : normal mood and affect [Orientated] : orientated [Able to Communicate] : able to communicate [Normal Skin] : normal skin [No obvious lymphadenopathy in areas examined] : no obvious lymphadenopathy in areas examined [Well Developed] : well developed [Well Nourished] : well nourished [Peripheral vascular exam is grossly normal] : peripheral vascular exam is grossly normal [No Respiratory Distress] : no respiratory distress [Right] : right shoulder [Standing] : standing [Moderate] : moderate [Mild] : mild [5___] : internal rotation 5[unfilled]/5 [] : no induration [FreeTextEntry8] : No tenderness along the scapular spine or acromion. [TWNoteComboBox7] : active forward flexion 170 degrees [de-identified] : active abduction 90 degrees [TWNoteComboBox6] : internal rotation L1 [de-identified] : external rotation 60 degrees

## 2023-01-17 NOTE — DISCUSSION/SUMMARY
[de-identified] : The patient is s/p a right shoulder revision arthroscopic rotator cuff repair using two Helix TI anchors, subacromial decompression, application of bioinductive patch, and application of PRP injection on 11/21/22.\par \par The patient is doing well.\par She is improving in both ROM and strength. \par She will continue PT and was given an rx. \par The patient will follow up as needed with Dr. Diego or Dr. Victoria.

## 2023-01-26 ENCOUNTER — APPOINTMENT (OUTPATIENT)
Dept: ORTHOPEDIC SURGERY | Facility: CLINIC | Age: 58
End: 2023-01-26

## 2023-02-09 ENCOUNTER — APPOINTMENT (OUTPATIENT)
Dept: ORTHOPEDIC SURGERY | Facility: CLINIC | Age: 58
End: 2023-02-09
Payer: COMMERCIAL

## 2023-02-09 VITALS — HEIGHT: 64 IN | WEIGHT: 135 LBS | BODY MASS INDEX: 23.05 KG/M2

## 2023-02-09 PROCEDURE — 99213 OFFICE O/P EST LOW 20 MIN: CPT | Mod: 25

## 2023-02-09 PROCEDURE — 20605 DRAIN/INJ JOINT/BURSA W/O US: CPT | Mod: 50,79

## 2023-02-09 NOTE — PHYSICAL EXAM
Telephone Encounter by Merle Wilson RN, BSN at 10/13/18 08:05 AM     Author:  Merle Wilson RN, BSN Service:  (none) Author Type:  Registered Nurse     Filed:  10/13/18 08:05 AM Encounter Date:  10/12/2018 Status:  Signed     :  Merle Wilson RN, BSN (Registered Nurse)       From: Lore Caraballo  To: Lydia Campoverde DPM  Sent: 10/12/2018  6:24 PM CDT  Subject: After-Visit Questions    Dear Dr. Campoverde, I have started wearing the inserts prescribed by you.   Since you said something about additional pads which will be given by you.   When can I come in to get them added? Any Saturday or weekday after 4pm   will work for me. Thank you. Lore Caraballo       Revision History        Date/Time User Provider Type Action    > 10/13/18 08:05 AM Merle Wilson RN, BSN Registered Nurse Sign    Attribution information within the note text is not available.             [Bilateral] : hand bilaterally [1st] : 1st [CMC Joint] : CMC joint

## 2023-02-09 NOTE — DISCUSSION/SUMMARY
[de-identified] : Discussed the nature of the diagnosis and risk and benefits of different modalities of treatment.\par Discussed conservative management vs CSI. \par Pt would like another CSI. \par Done today and tolerated well.\par

## 2023-02-09 NOTE — HISTORY OF PRESENT ILLNESS
[8] : 8 [Constant] : constant [Meds] : meds [Injection therapy] : injection therapy [de-identified] : 57 year old female followed for b/l first CMC arthritis. She is 5 months s/p CSI with good relief. Recent reoccurrence. \par  [] : no [FreeTextEntry1] : b/l wrists [FreeTextEntry9] : lidocaine topical ointment  [de-identified] : b/l inj on 9/19/22, she reports sxs improved up until about 6 weeks ago

## 2023-03-08 ENCOUNTER — APPOINTMENT (OUTPATIENT)
Dept: ORTHOPEDIC SURGERY | Facility: CLINIC | Age: 58
End: 2023-03-08
Payer: COMMERCIAL

## 2023-03-08 DIAGNOSIS — M75.32 CALCIFIC TENDINITIS OF LEFT SHOULDER: ICD-10-CM

## 2023-03-08 PROCEDURE — 20610 DRAIN/INJ JOINT/BURSA W/O US: CPT | Mod: LT

## 2023-03-08 PROCEDURE — 99214 OFFICE O/P EST MOD 30 MIN: CPT | Mod: 25

## 2023-03-08 NOTE — HISTORY OF PRESENT ILLNESS
[de-identified] : (Post-Operative Visit)\par \par Patient Details\par -Date of surgery: 11/21/22\par -Type of surgery: right revision RCR with patch augmentation, SAD with Dr Aponte\par -Current pain score: 2/10\par \par \par Treatment Details\par -Currently in physical therapy: yes\par -Location of PT: [facility]\par -Taking pain medication: no\par \par patient reports that her right shoulder is feeling better with good strength and function. \par \par regarding her left shoulder, she reports mild pain with activity. she has previously received CSI and it provides relief. history of MRI in 2020 which showed partial thickness supra tear. she is interested in another injection, last in december. \par \par \par

## 2023-03-08 NOTE — DISCUSSION/SUMMARY
[de-identified] : (Assessment and Plan)\par \par History, clinical examination and imaging were most consistent with:\par -s/p right revision RCR\par -left shoulder partial thickness rotator cuff tear\par \par The diagnosis was explained in detail. The potential non-surgical and surgical treatments were reviewed. The relative risks and benefits of each option were considered relative to the patient’s age, activity level, medical history, symptom severity and previously attempted treatments. \par \par -Patient is making good progress with her right shoulder and will continue PT and transition to a home exercise program. She can take OTC NSAID as needed for pain. \par -Regarding the left shoulder, she will proceed with cortisone injection for symptom relief. If symptoms persist we would consider a repeat MRI to evaluate for interval progression of her rotator cuff pathology. \par -Follow up in 3 months as needed for a clinical check.  \par \par (MDM) \par \par Problem Complexity\par -Moderate: chronic illness with exacerbation\par \par Risk\par -Moderate: injection\par \par -Patient has not been seen by another provider in my practice within the past 2 years who specializes in orthopedic sports medicine, shoulder or elbow surgery. \par \par \par (Injection Procedure Note)\par \par Laterality \par -Left\par \par Location\par -Subacromial bursa\par \par Contents\par -40 mg kenalog \par -5 mL of 1% lidocaine\par \par Narrative\par -Discussed possible risks of corticosteroid injection including hyperglycemia, infection and skin discoloration. \par -Discussed that due to infection risk, an interval between injection and any future surgery is 6 weeks for arthroscopy and 3 months for arthroplasty.\par -Informed consent was obtained.\par -Injection performed using aseptic technique. Tolerated well with no complications. \par

## 2023-03-08 NOTE — IMAGING
[de-identified] : (Post-Operative Shoulder Examination)\par \par Laterality\par -Right\par \par General\par -In no acute distress: yes\par -Alert and oriented to person, place and time: yes\par -Sensation grossly intact to light touch: yes\par -Capillary refill less than 2 seconds: yes \par \par Inspection of Surgical Incision\par -Skin edges well approximated: yes\par \par Range of Motion\par -Active forward elevation: 175\par -Passive forward elevation: 175\par -External rotation at the side: 60\par -Internal rotation behind the back: T12 \par \par Strength \par -Forward elevation: 5\par -External rotation at the side: 5\par -Internal rotation at the side: 5\par -Deltoid: 5\par \par \par (Shoulder Examination)\par \par Laterality\par -Left\par \par Inspection\par -Skin intact: yes\par -Swelling: no\par -Atrophy: no\par -Scapular winging: no\par -AC deformity: no\par -Biceps deformity: no\par \par Tenderness to Palpation\par -Bicipital groove: no \par -AC joint: no\par -Scapulothoracic: no\par -Cervical paraspinal: no\par \par Range of Motion\par -Active forward elevation: 175\par -Passive forward elevation: 175\par -External rotation at the side: 80\par -Internal rotation behind the back: T7 \par -Cervical spine: normal\par \par Strength \par -Forward elevation: 5\par -External rotation at the side: 5\par -Internal rotation at the side: 5\par -Deltoid: 5\par \par Special Tests\par -Cano: positive \par -O’José Miguel’s: negative\par -Speed’s: negative\par -Cross body adduction: negative\par -Apprehension and relocation: negative\par -Sulcus sign: negative\par -Belly press: negative\par -Spurling’s: negative\par \par

## 2023-03-09 ENCOUNTER — APPOINTMENT (OUTPATIENT)
Dept: ORTHOPEDIC SURGERY | Facility: CLINIC | Age: 58
End: 2023-03-09

## 2023-04-03 ENCOUNTER — APPOINTMENT (OUTPATIENT)
Dept: ORTHOPEDIC SURGERY | Facility: CLINIC | Age: 58
End: 2023-04-03
Payer: COMMERCIAL

## 2023-04-03 PROCEDURE — J3490M: CUSTOM

## 2023-04-03 PROCEDURE — 20611 DRAIN/INJ JOINT/BURSA W/US: CPT | Mod: 50

## 2023-04-03 PROCEDURE — 99214 OFFICE O/P EST MOD 30 MIN: CPT | Mod: 25

## 2023-04-03 NOTE — HISTORY OF PRESENT ILLNESS
[4] : 4 [3] : 3 [Dull/Aching] : dull/aching [Frequent] : frequent [Rest] : rest [Walking] : walking [de-identified] : 4/3/23: HA injections helped but pain returned recently.\par \par Prev doc:\par 11/29/21: Ms. Kennedi Castillo, a 56-year-old female, presents today for B/L knees. left knee worse then right. recently had Geniculate nerve ablation B/l knees with Dr. Frankenburger ablation nerve September 28, right knee/leg and Oct 5th left knee/leg. Complaint of locking in the left knee repeatedly. +nasids use. +nocturnal awakening. Tried CSI Injections and tried gel injections More relief from CSI injections.\par 1/31/22: Significant relief after last injection, wants to try again as pain returned recently.\par 8/29/22: Cortisone in Jan helped a lot, pain returned recently. \par 10/17/22: Orthovisc #1 b/l knees.\par 10/31/22: Orthovisc #3 b/l knees, improving.\par 11/7/22: Orthovisc #4 b/l knees. [FreeTextEntry1] : b/l knees

## 2023-04-03 NOTE — DISCUSSION/SUMMARY
[de-identified] : The natural progression of Osteoarthritis was explained to the patient.  We discussed the possible treatment options from conservative to operative.  These included NSAIDS, Glucosamine and Chondrotin sulfate, and Physical Therapy as well different types of injections.  We also discussed that at some point they may progress to needed a TKA.  Information and pamphlets were given when appropriate.\par \par Entered by Antonio Maldonado PA-C working as a scribe for Dr. Espinoza.\par

## 2023-04-03 NOTE — PROCEDURE
[FreeTextEntry3] : Large joint injection was performed on both knees. The indication for this procedure was pain, inflammation, and x-ray evidence of Osteoarthritis on this or prior visit. The site was prepped with betadine, ethyl chloride sprayed topically, and sterile technique used. An injection of Lidocaine 3cc of 1% , Bupivacaine (Marcaine) 5cc of 0.25% , Methylprednisolone (Depomedrol) cc of 80 mg was used. Patient was advised to call if redness, pain, or fever occur, apply ice for 15 minutes out of every hour for the next 12-24 hours as tolerated and patient was advised to rest the joint(s) for days.\par Patient has tried OTC's including aspirin, Ibuprofen, Aleve, etc or prescription NSAIDS, and/or exercises at home and/or physical therapy without satisfactory response and patient had decreased mobility in the joint. Ultrasound guidance was indicated for this patient due to better visualize joint space. All ultrasound images have been permanently captured and stored accordingly in our picture.\par

## 2023-04-03 NOTE — ASSESSMENT
[FreeTextEntry1] : Previous doc:\par 11/29/21: Adv B/L Varus OA, with left knee worse then right knee. Tried nsaids, CSI injections, Gel injections, and genicular nerve ablation B/L knees. Seeing as she exhausted all forms of conservative treatment for OA she is a candidate for B/L TKA. Reports her  is having TKA in 1-2 wks, so she will see how he responds, Pt requested CSI injection today for B/l knees.\par 1/31/22: Excellent relief from inj last visit - it has only been 2 months since then but will repeat inj today and discussed risk of injections too frequently - she will try to hold off until June for her next inj. She understands that she needs TKA but her  is currently recovering and she is trying to delay to later in the year.\par 8/29/22: Repeat inj both knees tolerated well.  Will get auth for orthovisc both knees.  Trying to delay TKA.\par 10/17/22: Inj tolerated well.\par 11/7/22: Inj tolerated well.\par \par 4/3/23: Cortisone inj both knees tolerated well.  Will return end of may to try HA injections again.

## 2023-04-03 NOTE — IMAGING
[de-identified] : left knee: \par pain med and lateral joint line \par Crepitus\par NIVI\par Strenth 5/5\par Pulses +2 DP/PT\par Decreased ROM -\par \par right knee: \par pain med and lateral joint line \par Crepitus\par NIVI\par Strenth 5/5\par Pulses +2 DP/PT\par Decreased ROM -\par

## 2023-04-07 ENCOUNTER — APPOINTMENT (OUTPATIENT)
Dept: ORTHOPEDIC SURGERY | Facility: CLINIC | Age: 58
End: 2023-04-07

## 2023-04-10 ENCOUNTER — RX ONLY (RX ONLY)
Age: 58
End: 2023-04-10

## 2023-04-10 ENCOUNTER — OFFICE (OUTPATIENT)
Dept: URBAN - METROPOLITAN AREA CLINIC 12 | Facility: CLINIC | Age: 58
Setting detail: OPHTHALMOLOGY
End: 2023-04-10
Payer: COMMERCIAL

## 2023-04-10 DIAGNOSIS — H43.811: ICD-10-CM

## 2023-04-10 DIAGNOSIS — H01.004: ICD-10-CM

## 2023-04-10 DIAGNOSIS — H17.9: ICD-10-CM

## 2023-04-10 DIAGNOSIS — H43.391: ICD-10-CM

## 2023-04-10 DIAGNOSIS — H01.001: ICD-10-CM

## 2023-04-10 DIAGNOSIS — Z96.1: ICD-10-CM

## 2023-04-10 PROCEDURE — 92014 COMPRE OPH EXAM EST PT 1/>: CPT | Performed by: OPHTHALMOLOGY

## 2023-04-10 ASSESSMENT — KERATOMETRY
OD_K1POWER_DIOPTERS: 44.75
OS_K2POWER_DIOPTERS: 46.00
METHOD_AUTO_MANUAL: AUTO
OD_K2POWER_DIOPTERS: 45.50
OS_AXISANGLE_DEGREES: 084
OS_K1POWER_DIOPTERS: 45.25
OD_AXISANGLE_DEGREES: 101

## 2023-04-10 ASSESSMENT — REFRACTION_AUTOREFRACTION
OS_CYLINDER: -0.75
OD_SPHERE: PLANO
OD_CYLINDER: -0.75
OS_AXIS: 003
OS_SPHERE: -0.50
OD_AXIS: 015

## 2023-04-10 ASSESSMENT — SPHEQUIV_DERIVED
OS_SPHEQUIV: -0.875
OS_SPHEQUIV: -0.875

## 2023-04-10 ASSESSMENT — REFRACTION_MANIFEST
OS_SPHERE: -0.50
OS_AXIS: 005
OD_AXIS: 015
OS_VA1: 20/20
OD_CYLINDER: -0.75
OS_ADD: +1.75
OS_CYLINDER: -0.75
OD_SPHERE: PLANO
OD_ADD: +1.75
OD_VA1: 20/20

## 2023-04-10 ASSESSMENT — CORNEAL SURGICAL SCARRING: OD_SCARRING: PERIPHERAL ANTERIOR STROMAL

## 2023-04-10 ASSESSMENT — REFRACTION_CURRENTRX
OS_OVR_VA: 20/
OD_CYLINDER: -1.25
OD_OVR_VA: 20/
OS_VPRISM_DIRECTION: PROGS
OD_VPRISM_DIRECTION: PROGS
OD_ADD: +2.25
OD_SPHERE: +1.25
OS_CYLINDER: -1.25
OD_VPRISM_DIRECTION: SV
OS_VPRISM_DIRECTION: SV
OD_SPHERE: -3.50
OD_AXIS: 014
OS_SPHERE: -3.50
OS_SPHERE: +1.25
OS_AXIS: 169
OS_ADD: +2.25
OD_OVR_VA: 20/
OS_OVR_VA: 20/

## 2023-04-10 ASSESSMENT — VISUAL ACUITY
OD_BCVA: 20/40-1
OS_BCVA: 20/30+2

## 2023-04-10 ASSESSMENT — LID EXAM ASSESSMENTS
OS_BLEPHARITIS: LUL 1+
OD_BLEPHARITIS: RUL 1+

## 2023-04-10 ASSESSMENT — TONOMETRY
OS_IOP_MMHG: 16
OD_IOP_MMHG: 13

## 2023-04-10 ASSESSMENT — AXIALLENGTH_DERIVED
OS_AL: 23.1603
OS_AL: 23.1603

## 2023-04-10 ASSESSMENT — CONFRONTATIONAL VISUAL FIELD TEST (CVF)
OS_FINDINGS: FULL
OD_FINDINGS: FULL

## 2023-05-15 ENCOUNTER — APPOINTMENT (OUTPATIENT)
Dept: ORTHOPEDIC SURGERY | Facility: CLINIC | Age: 58
End: 2023-05-15

## 2023-06-08 ENCOUNTER — APPOINTMENT (OUTPATIENT)
Dept: ORTHOPEDIC SURGERY | Facility: CLINIC | Age: 58
End: 2023-06-08

## 2023-08-10 ENCOUNTER — APPOINTMENT (OUTPATIENT)
Dept: ORTHOPEDIC SURGERY | Facility: CLINIC | Age: 58
End: 2023-08-10
Payer: COMMERCIAL

## 2023-08-10 VITALS — HEIGHT: 64 IN | WEIGHT: 135 LBS | BODY MASS INDEX: 23.05 KG/M2

## 2023-08-10 PROCEDURE — L3908: CPT

## 2023-08-10 PROCEDURE — 20605 DRAIN/INJ JOINT/BURSA W/O US: CPT | Mod: 50

## 2023-08-10 PROCEDURE — 99214 OFFICE O/P EST MOD 30 MIN: CPT | Mod: 25

## 2023-08-10 NOTE — HISTORY OF PRESENT ILLNESS
[8] : 8 [4] : 4 [Burning] : burning [Sharp] : sharp [Shooting] : shooting [Stabbing] : stabbing [Throbbing] : throbbing [Tightness] : tightness [Tingling] : tingling [] : yes [de-identified] : 58-year-old female followed for b/l CMC Arthritis. 6 months s/p b/l CMC CSI. With reoccurrence.. Today she is c/o b/l hand numbness and tingling for the past months. Worse at night, she has been nighttime splinting for the past 1 week.,  [FreeTextEntry1] : Bilateral wrists  [FreeTextEntry7] : bilateral forearms, numbness in hands

## 2023-08-10 NOTE — PHYSICAL EXAM
[Bilateral] : hand bilaterally [1st] : 1st [CMC Joint] : CMC joint [de-identified] : R hand:  Tender volar wrist  Good finger ROM  +Tinels  +Phalens  +Compression test  L hand:  +thenar atrophy  Tender volar wrist  Good finger ROM  +Tinels  +Phalens  +Compression test  Decreased sensation median nerve distribution

## 2023-08-10 NOTE — DISCUSSION/SUMMARY
[de-identified] : Discussed the nature of the diagnosis and risk and benefits of different modalities of treatment. Discussed conservative management vs CSI.  Pt would like another CSI n b/l CMC.  Done today and tolerated well. She will continue to night time splint for the next 4 weeks.  All questions answered. RTO 4 weeks.

## 2023-09-13 ENCOUNTER — APPOINTMENT (OUTPATIENT)
Dept: ORTHOPEDIC SURGERY | Facility: CLINIC | Age: 58
End: 2023-09-13
Payer: COMMERCIAL

## 2023-09-13 VITALS — WEIGHT: 135 LBS | BODY MASS INDEX: 23.05 KG/M2 | HEIGHT: 64 IN

## 2023-09-13 DIAGNOSIS — M25.511 PAIN IN RIGHT SHOULDER: ICD-10-CM

## 2023-09-13 DIAGNOSIS — Z98.890 OTHER SPECIFIED POSTPROCEDURAL STATES: ICD-10-CM

## 2023-09-13 PROCEDURE — 73030 X-RAY EXAM OF SHOULDER: CPT | Mod: RT

## 2023-09-13 PROCEDURE — 99213 OFFICE O/P EST LOW 20 MIN: CPT

## 2023-09-14 ENCOUNTER — APPOINTMENT (OUTPATIENT)
Dept: ORTHOPEDIC SURGERY | Facility: CLINIC | Age: 58
End: 2023-09-14

## 2024-01-11 ENCOUNTER — APPOINTMENT (OUTPATIENT)
Dept: ORTHOPEDIC SURGERY | Facility: CLINIC | Age: 59
End: 2024-01-11
Payer: COMMERCIAL

## 2024-01-11 VITALS — HEIGHT: 64 IN | WEIGHT: 135 LBS | BODY MASS INDEX: 23.05 KG/M2

## 2024-01-11 DIAGNOSIS — G56.01 CARPAL TUNNEL SYNDROME, RIGHT UPPER LIMB: ICD-10-CM

## 2024-01-11 DIAGNOSIS — G56.02 CARPAL TUNNEL SYNDROME, LEFT UPPER LIMB: ICD-10-CM

## 2024-01-11 PROCEDURE — 20605 DRAIN/INJ JOINT/BURSA W/O US: CPT | Mod: 50

## 2024-01-11 PROCEDURE — 99214 OFFICE O/P EST MOD 30 MIN: CPT | Mod: 25

## 2024-01-11 NOTE — DISCUSSION/SUMMARY
[de-identified] : Discussed the nature of the diagnosis and risk and benefits of different modalities of treatment. Discussed conservative management vs CSI.  Pt would like another CSI n b/l CMC.  Done today and tolerated well. All questions answered. RTO 4 weeks.

## 2024-01-11 NOTE — PHYSICAL EXAM
[Bilateral] : hand bilaterally [1st] : 1st [CMC Joint] : CMC joint [de-identified] : R hand:  Tender volar wrist  Good finger ROM  +Tinels  +Phalens  +Compression test  L hand:  +thenar atrophy  Tender volar wrist  Good finger ROM  +Tinels  +Phalens  +Compression test  Decreased sensation median nerve distribution

## 2024-05-02 ENCOUNTER — APPOINTMENT (OUTPATIENT)
Dept: ORTHOPEDIC SURGERY | Facility: CLINIC | Age: 59
End: 2024-05-02
Payer: COMMERCIAL

## 2024-05-02 VITALS — BODY MASS INDEX: 23.05 KG/M2 | HEIGHT: 64 IN | WEIGHT: 135 LBS

## 2024-05-02 DIAGNOSIS — M18.12 UNILATERAL PRIMARY OSTEOARTHRITIS OF FIRST CARPOMETACARPAL JOINT, LEFT HAND: ICD-10-CM

## 2024-05-02 DIAGNOSIS — M18.11 UNILATERAL PRIMARY OSTEOARTHRITIS OF FIRST CARPOMETACARPAL JOINT, RIGHT HAND: ICD-10-CM

## 2024-05-02 PROCEDURE — 20605 DRAIN/INJ JOINT/BURSA W/O US: CPT | Mod: 50

## 2024-05-02 NOTE — HISTORY OF PRESENT ILLNESS
[7] : 7 [Dull/Aching] : dull/aching [Sharp] : sharp [Throbbing] : throbbing [Ice] : ice [Injection therapy] : injection therapy [de-identified] : 58 year old female followed for b/l CMC arthritis. She was given b/l CMC injection 1/11/24. With reoccurrence. She is also followed for b/l CTS. She has nighttime splinted for a period of time and states she was no longer getting any numbness in the hands.   b/l CMC 1/2024, 8/2024, 2/2023, 9/2022, 6/2022 [] : no [FreeTextEntry1] : BL wrists  [de-identified] : CSI last visit 1/11 in BL wrists which helped

## 2024-05-02 NOTE — DISCUSSION/SUMMARY
[de-identified] : Discussed the nature of the diagnosis and risk and benefits of different modalities of treatment. b/l CMC arthritis  Discussed conservative management vs CSI.  Pt would like another CSI in b/l CMC. (#6)  Done today and tolerated well. All questions answered.

## 2024-06-10 ENCOUNTER — OFFICE (OUTPATIENT)
Dept: URBAN - METROPOLITAN AREA CLINIC 12 | Facility: CLINIC | Age: 59
Setting detail: OPHTHALMOLOGY
End: 2024-06-10
Payer: COMMERCIAL

## 2024-06-10 DIAGNOSIS — H43.391: ICD-10-CM

## 2024-06-10 DIAGNOSIS — H01.004: ICD-10-CM

## 2024-06-10 DIAGNOSIS — H43.811: ICD-10-CM

## 2024-06-10 DIAGNOSIS — H26.493: ICD-10-CM

## 2024-06-10 DIAGNOSIS — H17.9: ICD-10-CM

## 2024-06-10 DIAGNOSIS — H01.001: ICD-10-CM

## 2024-06-10 PROCEDURE — 99214 OFFICE O/P EST MOD 30 MIN: CPT | Performed by: OPHTHALMOLOGY

## 2024-06-10 ASSESSMENT — CONFRONTATIONAL VISUAL FIELD TEST (CVF)
OS_FINDINGS: FULL
OD_FINDINGS: FULL

## 2024-06-10 ASSESSMENT — LID EXAM ASSESSMENTS
OD_BLEPHARITIS: RUL 1+
OS_BLEPHARITIS: LUL 1+

## 2024-06-21 ENCOUNTER — RX ONLY (RX ONLY)
Age: 59
End: 2024-06-21

## 2024-06-21 ENCOUNTER — ASC (OUTPATIENT)
Dept: URBAN - METROPOLITAN AREA SURGERY 8 | Facility: SURGERY | Age: 59
Setting detail: OPHTHALMOLOGY
End: 2024-06-21
Payer: COMMERCIAL

## 2024-06-21 DIAGNOSIS — H26.491: ICD-10-CM

## 2024-06-21 PROCEDURE — 66821 AFTER CATARACT LASER SURGERY: CPT | Mod: RT | Performed by: OPHTHALMOLOGY

## 2024-07-01 ENCOUNTER — APPOINTMENT (OUTPATIENT)
Dept: ORTHOPEDIC SURGERY | Facility: CLINIC | Age: 59
End: 2024-07-01
Payer: COMMERCIAL

## 2024-07-01 VITALS — WEIGHT: 135 LBS | BODY MASS INDEX: 23.05 KG/M2 | HEIGHT: 64 IN

## 2024-07-01 DIAGNOSIS — M17.0 BILATERAL PRIMARY OSTEOARTHRITIS OF KNEE: ICD-10-CM

## 2024-07-01 PROCEDURE — 20611 DRAIN/INJ JOINT/BURSA W/US: CPT | Mod: LT

## 2024-07-01 PROCEDURE — 99214 OFFICE O/P EST MOD 30 MIN: CPT | Mod: 25

## 2024-07-05 ENCOUNTER — ASC (OUTPATIENT)
Dept: URBAN - METROPOLITAN AREA SURGERY 8 | Facility: SURGERY | Age: 59
Setting detail: OPHTHALMOLOGY
End: 2024-07-05
Payer: COMMERCIAL

## 2024-07-05 DIAGNOSIS — H26.492: ICD-10-CM

## 2024-07-05 PROCEDURE — 66821 AFTER CATARACT LASER SURGERY: CPT | Mod: 79,LT | Performed by: OPHTHALMOLOGY

## 2024-07-23 ENCOUNTER — OFFICE (OUTPATIENT)
Dept: URBAN - METROPOLITAN AREA CLINIC 12 | Facility: CLINIC | Age: 59
Setting detail: OPHTHALMOLOGY
End: 2024-07-23
Payer: COMMERCIAL

## 2024-07-23 DIAGNOSIS — H26.493: ICD-10-CM

## 2024-07-23 PROBLEM — H16.223 DRY EYE SYNDROME K SICCA; BOTH EYES: Status: ACTIVE | Noted: 2024-07-23

## 2024-07-23 PROCEDURE — 99024 POSTOP FOLLOW-UP VISIT: CPT | Performed by: OPTOMETRIST

## 2024-07-23 ASSESSMENT — LID EXAM ASSESSMENTS
OS_BLEPHARITIS: LUL 1+
OD_BLEPHARITIS: RUL 1+

## 2024-07-23 ASSESSMENT — CONFRONTATIONAL VISUAL FIELD TEST (CVF)
OS_FINDINGS: FULL
OD_FINDINGS: FULL

## 2024-07-29 ENCOUNTER — APPOINTMENT (OUTPATIENT)
Dept: ORTHOPEDIC SURGERY | Facility: CLINIC | Age: 59
End: 2024-07-29
Payer: COMMERCIAL

## 2024-07-29 PROCEDURE — 20611 DRAIN/INJ JOINT/BURSA W/US: CPT | Mod: 50

## 2024-07-29 NOTE — PROCEDURE
[Large Joint Injection] : Large joint injection [Bilateral] : bilaterally of the [Knee] : knee [Pain] : pain [Inflammation] : inflammation [X-ray evidence of Osteoarthritis on this or prior visit] : x-ray evidence of Osteoarthritis on this or prior visit [Repeat series performed] : repeat series performed [Betadine] : betadine [Ethyl Chloride sprayed topically] : ethyl chloride sprayed topically [Sterile technique used] : sterile technique used [Euflexxa(20mg)] : 20mg of Euflexxa [#1] : series #1 [] : Patient tolerated procedure well [Call if redness, pain or fever occur] : call if redness, pain or fever occur [Apply ice for 15min out of every hour for the next 12-24 hours as tolerated] : apply ice for 15 minutes out of every hour for the next 12-24 hours as tolerated [Previous OTC use and PT nontherapeutic] : patient has tried OTC's including aspirin, Ibuprofen, Aleve, etc or prescription NSAIDS, and/or exercises at home and/or physical therapy without satisfactory response [Risks, benefits, alternatives discussed / Verbal consent obtained] : the risks benefits, and alternatives have been discussed, and verbal consent was obtained [Prior failure or difficult injection] : prior failure or difficult injection [All ultrasound images have been permanently captured and stored accordingly in our picture archiving and communication system] : All ultrasound images have been permanently captured and stored accordingly in our picture archiving and communication system [Visualization of the needle and placement of injection was performed without complication] : visualization of the needle and placement of injection was performed without complication

## 2024-07-29 NOTE — ASSESSMENT
[FreeTextEntry1] : Previous doc: 11/29/21: Adv B/L Varus OA, with left knee worse then right knee. Tried nsaids, CSI injections, Gel injections, and genicular nerve ablation B/L knees. Seeing as she exhausted all forms of conservative treatment for OA she is a candidate for B/L TKA. Reports her  is having TKA in 1-2 wks, so she will see how he responds, Pt requested CSI injection today for B/l knees. 1/31/22: Excellent relief from inj last visit - it has only been 2 months since then but will repeat inj today and discussed risk of injections too frequently - she will try to hold off until June for her next inj. She understands that she needs TKA but her  is currently recovering and she is trying to delay to later in the year. 8/29/22: Repeat inj both knees tolerated well.  Will get auth for orthovisc both knees.  Trying to delay TKA. 10/17/22: Inj tolerated well. 11/7/22: Inj tolerated well. 4/3/23: Cortisone inj both knees tolerated well.  Will return end of may to try HA injections again. 7/1/24: Pt with adv b/l varus OA (L>R).. She is well informed and would like to proceed with LT knee csi today- ambrose well. Also will put in auth for b/l HA series. She recently had RT knee csi in May so cannot inject her RT knee today. Did discuss L TKA which we will likely do at the surgical center in December. Pt is already on Celebrex which she tolerates well and I recommend continuing with this. Follow up 1 month to begin Euflexxa series  7/29/24: Inj tolerated well.

## 2024-07-29 NOTE — IMAGING
[de-identified] : left knee:  pain med and lateral joint line  Crepitus NIVI Strenth 5/5 Pulses +2 DP/PT Decreased ROM -  right knee:  pain med and lateral joint line  Crepitus NIVI Strenth 5/5 Pulses +2 DP/PT Decreased ROM -  XR B/L KNEE showing adv valgus OA

## 2024-07-29 NOTE — IMAGING
[de-identified] : left knee:  pain med and lateral joint line  Crepitus NIVI Strenth 5/5 Pulses +2 DP/PT Decreased ROM -  right knee:  pain med and lateral joint line  Crepitus NIVI Strenth 5/5 Pulses +2 DP/PT Decreased ROM -  XR B/L KNEE showing adv valgus OA

## 2024-07-29 NOTE — HISTORY OF PRESENT ILLNESS
[9] : 9 [de-identified] : 7/29/24: Euflexxa #1 b/l knees.  Prev doc: 11/29/21: Ms. Kennedi Castillo, a 56-year-old female, presents today for B/L knees. left knee worse then right. recently had Geniculate nerve ablation B/l knees with Dr. Frankenburger ablation nerve September 28, right knee/leg and Oct 5th left knee/leg. Complaint of locking in the left knee repeatedly. +nasids use. +nocturnal awakening. Tried CSI Injections and tried gel injections More relief from CSI injections. 1/31/22: Significant relief after last injection, wants to try again as pain returned recently. 8/29/22: Cortisone in Jt helped a lot, pain returned recently.  10/17/22: Orthovisc #1 b/l knees. 10/31/22: Orthovisc #3 b/l knees, improving. 11/7/22: Orthovisc #4 b/l knees. 4/3/23: HA injections helped but pain returned recently. 7/1/24: Pt with adv B/L Varus OA (L>R). Had RT knee csi back in May with her pain management - provided good relief- still has some relief in the RT knee but did not do LT knee as she was told it would not be covered by her insurance. Interested in putting in auth for HA series for b/l knees- states she is interested in doing L TKA at the end of this year.  Hx of 2 ablations for knee pain- states they did help her in the past but a third ablation was denied [] : no [FreeTextEntry5] : want to do csi inj. pain increasing.

## 2024-07-29 NOTE — HISTORY OF PRESENT ILLNESS
[9] : 9 [de-identified] : 7/29/24: Euflexxa #1 b/l knees.  Prev doc: 11/29/21: Ms. Kennedi Castillo, a 56-year-old female, presents today for B/L knees. left knee worse then right. recently had Geniculate nerve ablation B/l knees with Dr. Frankenburger ablation nerve September 28, right knee/leg and Oct 5th left knee/leg. Complaint of locking in the left knee repeatedly. +nasids use. +nocturnal awakening. Tried CSI Injections and tried gel injections More relief from CSI injections. 1/31/22: Significant relief after last injection, wants to try again as pain returned recently. 8/29/22: Cortisone in Jt helped a lot, pain returned recently.  10/17/22: Orthovisc #1 b/l knees. 10/31/22: Orthovisc #3 b/l knees, improving. 11/7/22: Orthovisc #4 b/l knees. 4/3/23: HA injections helped but pain returned recently. 7/1/24: Pt with adv B/L Varus OA (L>R). Had RT knee csi back in May with her pain management - provided good relief- still has some relief in the RT knee but did not do LT knee as she was told it would not be covered by her insurance. Interested in putting in auth for HA series for b/l knees- states she is interested in doing L TKA at the end of this year.  Hx of 2 ablations for knee pain- states they did help her in the past but a third ablation was denied [] : no [FreeTextEntry5] : want to do csi inj. pain increasing.

## 2024-08-05 ENCOUNTER — APPOINTMENT (OUTPATIENT)
Dept: ORTHOPEDIC SURGERY | Facility: CLINIC | Age: 59
End: 2024-08-05

## 2024-08-05 PROCEDURE — 20611 DRAIN/INJ JOINT/BURSA W/US: CPT | Mod: 50

## 2024-08-05 NOTE — PROCEDURE
[Large Joint Injection] : Large joint injection [Bilateral] : bilaterally of the [Knee] : knee [Pain] : pain [Inflammation] : inflammation [X-ray evidence of Osteoarthritis on this or prior visit] : x-ray evidence of Osteoarthritis on this or prior visit [Repeat series performed] : repeat series performed [Betadine] : betadine [Ethyl Chloride sprayed topically] : ethyl chloride sprayed topically [Sterile technique used] : sterile technique used [Euflexxa(20mg)] : 20mg of Euflexxa [] : Patient tolerated procedure well [Call if redness, pain or fever occur] : call if redness, pain or fever occur [Apply ice for 15min out of every hour for the next 12-24 hours as tolerated] : apply ice for 15 minutes out of every hour for the next 12-24 hours as tolerated [Previous OTC use and PT nontherapeutic] : patient has tried OTC's including aspirin, Ibuprofen, Aleve, etc or prescription NSAIDS, and/or exercises at home and/or physical therapy without satisfactory response [Risks, benefits, alternatives discussed / Verbal consent obtained] : the risks benefits, and alternatives have been discussed, and verbal consent was obtained [Prior failure or difficult injection] : prior failure or difficult injection [All ultrasound images have been permanently captured and stored accordingly in our picture archiving and communication system] : All ultrasound images have been permanently captured and stored accordingly in our picture archiving and communication system [Visualization of the needle and placement of injection was performed without complication] : visualization of the needle and placement of injection was performed without complication [#2] : series #2

## 2024-08-05 NOTE — HISTORY OF PRESENT ILLNESS
[de-identified] : 8/5/24: Euflexxa #2 b/l knees.  Prev doc: 11/29/21: Ms. Kennedi Castillo, a 56-year-old female, presents today for B/L knees. left knee worse then right. recently had Geniculate nerve ablation B/l knees with Dr. Frankenburger ablation nerve September 28, right knee/leg and Oct 5th left knee/leg. Complaint of locking in the left knee repeatedly. +nasids use. +nocturnal awakening. Tried CSI Injections and tried gel injections More relief from CSI injections. 1/31/22: Significant relief after last injection, wants to try again as pain returned recently. 8/29/22: Cortisone in Jt helped a lot, pain returned recently.  10/17/22: Orthovisc #1 b/l knees. 10/31/22: Orthovisc #3 b/l knees, improving. 11/7/22: Orthovisc #4 b/l knees. 4/3/23: HA injections helped but pain returned recently. 7/1/24: Pt with adv B/L Varus OA (L>R). Had RT knee csi back in May with her pain management Dr- provided good relief- still has some relief in the RT knee but did not do LT knee as she was told it would not be covered by her insurance. Interested in putting in auth for HA series for b/l knees- states she is interested in doing L TKA at the end of this year.  Hx of 2 ablations for knee pain- states they did help her in the past but a third ablation was denied 7/29/24: Euflexxa #1 b/l knees. [] : no

## 2024-08-05 NOTE — ASSESSMENT
[FreeTextEntry1] : Previous doc: 11/29/21: Adv B/L Varus OA, with left knee worse then right knee. Tried nsaids, CSI injections, Gel injections, and genicular nerve ablation B/L knees. Seeing as she exhausted all forms of conservative treatment for OA she is a candidate for B/L TKA. Reports her  is having TKA in 1-2 wks, so she will see how he responds, Pt requested CSI injection today for B/l knees. 1/31/22: Excellent relief from inj last visit - it has only been 2 months since then but will repeat inj today and discussed risk of injections too frequently - she will try to hold off until June for her next inj. She understands that she needs TKA but her  is currently recovering and she is trying to delay to later in the year. 8/29/22: Repeat inj both knees tolerated well.  Will get auth for orthovisc both knees.  Trying to delay TKA. 10/17/22: Inj tolerated well. 11/7/22: Inj tolerated well. 4/3/23: Cortisone inj both knees tolerated well.  Will return end of may to try HA injections again. 7/1/24: Pt with adv b/l varus OA (L>R).. She is well informed and would like to proceed with LT knee csi today- ambrose well. Also will put in auth for b/l HA series. She recently had RT knee csi in May so cannot inject her RT knee today. Did discuss L TKA which we will likely do at the surgical center in December. Pt is already on Celebrex which she tolerates well and I recommend continuing with this. Follow up 1 month to begin Euflexxa series 7/29/24: Inj tolerated well.  8/5/24: Inj tolerated well.

## 2024-08-05 NOTE — IMAGING
[de-identified] : left knee:  pain med and lateral joint line  Crepitus NIVI Strenth 5/5 Pulses +2 DP/PT Decreased ROM -  right knee:  pain med and lateral joint line  Crepitus NIVI Strenth 5/5 Pulses +2 DP/PT Decreased ROM -  XR B/L KNEE showing adv valgus OA

## 2024-08-05 NOTE — DISCUSSION/SUMMARY
[de-identified] : The natural progression of Osteoarthritis was explained to the patient.  We discussed the possible treatment options from conservative to operative.  These included NSAIDS, Glucosamine and Chondrotin sulfate, and Physical Therapy as well different types of injections.  We also discussed that at some point they may progress to needed a TKA.  Information and pamphlets were given when appropriate.[email or web address]  The risks, benefits, contents and alternatives to injection were explained in full to the patient.  Risks outlined include but are not limited to infection, sepsis, bleeding, scarring, skin discoloration, temporary increase in pain, syncopal episode, failure to resolve symptoms, allergic reaction, flare reaction, permanent white skin discoloration, symptom recurrence, and elevation of blood sugar in diabetics.  Patient understood the risks.  All questions were answered.  After discussion of options, patient requested an injection.  Oral informed consent was obtained and sterile prep was done of the injection site.  Sterile technique was used to introduce the mixture.  Patient tolerated the procedure well.  Patient advised to ice the injection site this evening.  Signs and symptoms of infection reviewed and patient advised to call immediately for redness, fevers, and/or chills.

## 2024-08-19 ENCOUNTER — APPOINTMENT (OUTPATIENT)
Dept: ORTHOPEDIC SURGERY | Facility: CLINIC | Age: 59
End: 2024-08-19
Payer: COMMERCIAL

## 2024-08-19 VITALS — BODY MASS INDEX: 23.05 KG/M2 | HEIGHT: 64 IN | WEIGHT: 135 LBS

## 2024-08-19 DIAGNOSIS — M17.0 BILATERAL PRIMARY OSTEOARTHRITIS OF KNEE: ICD-10-CM

## 2024-08-19 PROCEDURE — 20611 DRAIN/INJ JOINT/BURSA W/US: CPT | Mod: 50

## 2024-08-19 NOTE — HISTORY OF PRESENT ILLNESS
[3] : 3 [Euflexxa] : Euflexxa [de-identified] : 8/19/24: Euflexxa #3 b/l knees.   Prev doc: 11/29/21: Ms. Kennedi Castillo, a 56-year-old female, presents today for B/L knees. left knee worse then right. recently had Geniculate nerve ablation B/l knees with Dr. Frankenburger ablation nerve September 28, right knee/leg and Oct 5th left knee/leg. Complaint of locking in the left knee repeatedly. +nasids use. +nocturnal awakening. Tried CSI Injections and tried gel injections More relief from CSI injections. 1/31/22: Significant relief after last injection, wants to try again as pain returned recently. 8/29/22: Cortisone in Jan helped a lot, pain returned recently.  10/17/22: Orthovisc #1 b/l knees. 10/31/22: Orthovisc #3 b/l knees, improving. 11/7/22: Orthovisc #4 b/l knees. 4/3/23: HA injections helped but pain returned recently. 7/1/24: Pt with adv B/L Varus OA (L>R). Had RT knee csi back in May with her pain management Dr- provided good relief- still has some relief in the RT knee but did not do LT knee as she was told it would not be covered by her insurance. Interested in putting in auth for HA series for b/l knees- states she is interested in doing L TKA at the end of this year.  Hx of 2 ablations for knee pain- states they did help her in the past but a third ablation was denied 7/29/24: Euflexxa #1 b/l knees. 8/5/24: Euflexxa #2 b/l knees.

## 2024-08-19 NOTE — ASSESSMENT
[FreeTextEntry1] : Previous doc: 11/29/21: Adv B/L Varus OA, with left knee worse then right knee. Tried nsaids, CSI injections, Gel injections, and genicular nerve ablation B/L knees. Seeing as she exhausted all forms of conservative treatment for OA she is a candidate for B/L TKA. Reports her  is having TKA in 1-2 wks, so she will see how he responds, Pt requested CSI injection today for B/l knees. 1/31/22: Excellent relief from inj last visit - it has only been 2 months since then but will repeat inj today and discussed risk of injections too frequently - she will try to hold off until June for her next inj. She understands that she needs TKA but her  is currently recovering and she is trying to delay to later in the year. 8/29/22: Repeat inj both knees tolerated well.  Will get auth for orthovisc both knees.  Trying to delay TKA. 10/17/22: Inj tolerated well. 11/7/22: Inj tolerated well. 4/3/23: Cortisone inj both knees tolerated well.  Will return end of may to try HA injections again. 7/1/24: Pt with adv b/l varus OA (L>R).. She is well informed and would like to proceed with LT knee csi today- ambrose well. Also will put in auth for b/l HA series. She recently had RT knee csi in May so cannot inject her RT knee today. Did discuss L TKA which we will likely do at the surgical center in December. Pt is already on Celebrex which she tolerates well and I recommend continuing with this. Follow up 1 month to begin Euflexxa series 7/29/24: Inj tolerated well. 8/5/24: Inj tolerated well.  8/19/24: Inj tolerated well. F/up 4-6 weeks for re eval

## 2024-08-19 NOTE — IMAGING
[de-identified] : left knee:  pain med and lateral joint line  Crepitus NIVI Strenth 5/5 Pulses +2 DP/PT Decreased ROM -  right knee:  pain med and lateral joint line  Crepitus NIVI Strenth 5/5 Pulses +2 DP/PT Decreased ROM -  XR B/L KNEE showing adv valgus OA

## 2024-08-19 NOTE — PROCEDURE
[Large Joint Injection] : Large joint injection [Bilateral] : bilaterally of the [Knee] : knee [Pain] : pain [Inflammation] : inflammation [X-ray evidence of Osteoarthritis on this or prior visit] : x-ray evidence of Osteoarthritis on this or prior visit [Repeat series performed] : repeat series performed [Betadine] : betadine [Ethyl Chloride sprayed topically] : ethyl chloride sprayed topically [Sterile technique used] : sterile technique used [Euflexxa(20mg)] : 20mg of Euflexxa [] : Patient tolerated procedure well [Call if redness, pain or fever occur] : call if redness, pain or fever occur [Apply ice for 15min out of every hour for the next 12-24 hours as tolerated] : apply ice for 15 minutes out of every hour for the next 12-24 hours as tolerated [Previous OTC use and PT nontherapeutic] : patient has tried OTC's including aspirin, Ibuprofen, Aleve, etc or prescription NSAIDS, and/or exercises at home and/or physical therapy without satisfactory response [Risks, benefits, alternatives discussed / Verbal consent obtained] : the risks benefits, and alternatives have been discussed, and verbal consent was obtained [Prior failure or difficult injection] : prior failure or difficult injection [All ultrasound images have been permanently captured and stored accordingly in our picture archiving and communication system] : All ultrasound images have been permanently captured and stored accordingly in our picture archiving and communication system [Visualization of the needle and placement of injection was performed without complication] : visualization of the needle and placement of injection was performed without complication

## 2024-08-19 NOTE — DISCUSSION/SUMMARY
[de-identified] : The natural progression of Osteoarthritis was explained to the patient.  We discussed the possible treatment options from conservative to operative.  These included NSAIDS, Glucosamine and Chondrotin sulfate, and Physical Therapy as well different types of injections.  We also discussed that at some point they may progress to needed a TKA.  Information and pamphlets were given when appropriate.  The risks, benefits, contents and alternatives to injection were explained in full to the patient.  Risks outlined include but are not limited to infection, sepsis, bleeding, scarring, skin discoloration, temporary increase in pain, syncopal episode, failure to resolve symptoms, allergic reaction, flare reaction, permanent white skin discoloration, symptom recurrence, and elevation of blood sugar in diabetics.  Patient understood the risks.  All questions were answered.  After discussion of options, patient requested an injection.  Oral informed consent was obtained and sterile prep was done of the injection site.  Sterile technique was used to introduce the mixture.  Patient tolerated the procedure well.  Patient advised to ice the injection site this evening.  Signs and symptoms of infection reviewed and patient advised to call immediately for redness, fevers, and/or chills.

## 2025-04-21 ENCOUNTER — APPOINTMENT (OUTPATIENT)
Dept: ORTHOPEDIC SURGERY | Facility: CLINIC | Age: 60
End: 2025-04-21

## 2025-04-21 VITALS — WEIGHT: 135 LBS | HEIGHT: 64 IN | BODY MASS INDEX: 23.05 KG/M2

## 2025-04-21 DIAGNOSIS — M18.12 UNILATERAL PRIMARY OSTEOARTHRITIS OF FIRST CARPOMETACARPAL JOINT, LEFT HAND: ICD-10-CM

## 2025-04-21 DIAGNOSIS — M18.11 UNILATERAL PRIMARY OSTEOARTHRITIS OF FIRST CARPOMETACARPAL JOINT, RIGHT HAND: ICD-10-CM

## 2025-04-21 PROCEDURE — 20600 DRAIN/INJ JOINT/BURSA W/O US: CPT | Mod: 50

## 2025-07-28 ENCOUNTER — OFFICE (OUTPATIENT)
Dept: URBAN - METROPOLITAN AREA CLINIC 12 | Facility: CLINIC | Age: 60
Setting detail: OPHTHALMOLOGY
End: 2025-07-28
Payer: COMMERCIAL

## 2025-07-28 DIAGNOSIS — H26.493: ICD-10-CM

## 2025-07-28 DIAGNOSIS — H01.004: ICD-10-CM

## 2025-07-28 DIAGNOSIS — H43.811: ICD-10-CM

## 2025-07-28 DIAGNOSIS — H35.371: ICD-10-CM

## 2025-07-28 DIAGNOSIS — H16.223: ICD-10-CM

## 2025-07-28 DIAGNOSIS — H17.9: ICD-10-CM

## 2025-07-28 DIAGNOSIS — H43.391: ICD-10-CM

## 2025-07-28 DIAGNOSIS — H01.001: ICD-10-CM

## 2025-07-28 PROCEDURE — 92014 COMPRE OPH EXAM EST PT 1/>: CPT | Performed by: OPHTHALMOLOGY

## 2025-07-28 PROCEDURE — 92134 CPTRZ OPH DX IMG PST SGM RTA: CPT | Performed by: OPHTHALMOLOGY

## 2025-07-28 ASSESSMENT — REFRACTION_MANIFEST
OD_VA1: 20/NI
OD_SPHERE: PLANO
OD_CYLINDER: -1.00
OD_ADD: +1.75
OS_AXIS: 162
OS_CYLINDER: -0.50
OS_VA1: 20/ 30
OS_VA1: 20/20-3
OS_SPHERE: PLANO
OD_CYLINDER: -0.25
OD_AXIS: 025
OS_ADD: +1.75
OS_AXIS: 170
OD_SPHERE: +0.50
OD_AXIS: 002
OD_VA1: 20/40
OS_CYLINDER: -0.50
OS_SPHERE: -0.25

## 2025-07-28 ASSESSMENT — REFRACTION_CURRENTRX
OD_OVR_VA: 20/
OS_VPRISM_DIRECTION: PROGS
OS_OVR_VA: 20/
OD_VPRISM_DIRECTION: SV
OS_OVR_VA: 20/
OD_AXIS: 014
OS_SPHERE: -3.50
OS_CYLINDER: -1.25
OD_SPHERE: -3.50
OD_ADD: +1.25
OS_ADD: +1.25
OD_VPRISM_DIRECTION: PROGS
OS_ADD: +2.25
OS_VPRISM_DIRECTION: SV
OD_OVR_VA: 20/
OD_CYLINDER: -1.25
OS_AXIS: 169
OD_ADD: +2.25

## 2025-07-28 ASSESSMENT — KERATOMETRY
OS_K1POWER_DIOPTERS: 45.00
OS_AXISANGLE_DEGREES: 079
OD_K1POWER_DIOPTERS: 44.75
METHOD_AUTO_MANUAL: AUTO
OD_AXISANGLE_DEGREES: 098
OD_K2POWER_DIOPTERS: 45.50
OS_K2POWER_DIOPTERS: 45.75

## 2025-07-28 ASSESSMENT — LID EXAM ASSESSMENTS
OD_BLEPHARITIS: RUL 1+
OS_BLEPHARITIS: LUL 1+

## 2025-07-28 ASSESSMENT — VISUAL ACUITY
OS_BCVA: 20/20-1
OD_BCVA: 20/30-2

## 2025-07-28 ASSESSMENT — TONOMETRY
OS_IOP_MMHG: 14
OD_IOP_MMHG: 12

## 2025-07-28 ASSESSMENT — CONFRONTATIONAL VISUAL FIELD TEST (CVF)
OS_FINDINGS: FULL
OD_FINDINGS: FULL

## 2025-07-28 ASSESSMENT — REFRACTION_AUTOREFRACTION
OS_SPHERE: -0.25
OS_CYLINDER: -0.50
OD_AXIS: 027
OS_AXIS: 168
OD_SPHERE: PLANO
OD_CYLINDER: -0.25

## 2025-07-28 ASSESSMENT — CORNEAL SURGICAL SCARRING: OD_SCARRING: PERIPHERAL ANTERIOR STROMAL

## 2025-07-28 ASSESSMENT — SUPERFICIAL PUNCTATE KERATITIS (SPK)
OD_SPK: 1+
OS_SPK: T